# Patient Record
Sex: MALE | Race: WHITE | NOT HISPANIC OR LATINO | Employment: UNEMPLOYED | URBAN - METROPOLITAN AREA
[De-identification: names, ages, dates, MRNs, and addresses within clinical notes are randomized per-mention and may not be internally consistent; named-entity substitution may affect disease eponyms.]

---

## 2017-08-15 ENCOUNTER — HOSPITAL ENCOUNTER (INPATIENT)
Facility: HOSPITAL | Age: 37
LOS: 7 days | Discharge: HOME | End: 2017-08-22
Attending: EMERGENCY MEDICINE | Admitting: INTERNAL MEDICINE

## 2017-08-15 DIAGNOSIS — E10.10 DIABETIC KETOACIDOSIS WITHOUT COMA ASSOCIATED WITH TYPE 1 DIABETES MELLITUS (HCC): Primary | ICD-10-CM

## 2017-08-15 PROBLEM — E11.10 DKA (DIABETIC KETOACIDOSES): Status: ACTIVE | Noted: 2017-08-15

## 2017-08-15 LAB
ALBUMIN SERPL-MCNC: 4.2 G/DL (ref 3.5–5.2)
ALBUMIN/GLOB SERPL: 1.8 G/DL
ALP SERPL-CCNC: 89 U/L (ref 39–117)
ALT SERPL W P-5'-P-CCNC: 15 U/L (ref 1–41)
ANION GAP SERPL CALCULATED.3IONS-SCNC: 25.7 MMOL/L
AST SERPL-CCNC: 13 U/L (ref 1–40)
B-OH-BUTYR SERPL-SCNC: 6.04 MMOL/L (ref 0.02–0.27)
BASOPHILS # BLD AUTO: 0.04 10*3/MM3 (ref 0–0.2)
BASOPHILS NFR BLD AUTO: 0.5 % (ref 0–1.5)
BILIRUB SERPL-MCNC: 0.9 MG/DL (ref 0.1–1.2)
BILIRUB UR QL STRIP: NEGATIVE
BUN BLD-MCNC: 17 MG/DL (ref 6–20)
BUN/CREAT SERPL: 22.4 (ref 7–25)
CALCIUM SPEC-SCNC: 8.3 MG/DL (ref 8.6–10.5)
CHLORIDE SERPL-SCNC: 95 MMOL/L (ref 98–107)
CLARITY UR: CLEAR
CO2 SERPL-SCNC: 15.3 MMOL/L (ref 22–29)
COLOR UR: YELLOW
CREAT BLD-MCNC: 0.76 MG/DL (ref 0.76–1.27)
DEPRECATED RDW RBC AUTO: 48 FL (ref 37–54)
EOSINOPHIL # BLD AUTO: 0.02 10*3/MM3 (ref 0–0.7)
EOSINOPHIL NFR BLD AUTO: 0.3 % (ref 0.3–6.2)
ERYTHROCYTE [DISTWIDTH] IN BLOOD BY AUTOMATED COUNT: 13.8 % (ref 11.5–14.5)
GFR SERPL CREATININE-BSD FRML MDRD: 117 ML/MIN/1.73
GLOBULIN UR ELPH-MCNC: 2.3 GM/DL
GLUCOSE BLD-MCNC: 473 MG/DL (ref 65–99)
GLUCOSE BLDC GLUCOMTR-MCNC: 380 MG/DL (ref 70–130)
GLUCOSE UR STRIP-MCNC: ABNORMAL MG/DL
HCT VFR BLD AUTO: 40.8 % (ref 40.4–52.2)
HGB BLD-MCNC: 12.8 G/DL (ref 13.7–17.6)
HGB UR QL STRIP.AUTO: NEGATIVE
IMM GRANULOCYTES # BLD: 0.02 10*3/MM3 (ref 0–0.03)
IMM GRANULOCYTES NFR BLD: 0.3 % (ref 0–0.5)
KETONES UR QL STRIP: ABNORMAL
LEUKOCYTE ESTERASE UR QL STRIP.AUTO: NEGATIVE
LYMPHOCYTES # BLD AUTO: 2.53 10*3/MM3 (ref 0.9–4.8)
LYMPHOCYTES NFR BLD AUTO: 32.9 % (ref 19.6–45.3)
MCH RBC QN AUTO: 29.6 PG (ref 27–32.7)
MCHC RBC AUTO-ENTMCNC: 31.4 G/DL (ref 32.6–36.4)
MCV RBC AUTO: 94.4 FL (ref 79.8–96.2)
MONOCYTES # BLD AUTO: 0.56 10*3/MM3 (ref 0.2–1.2)
MONOCYTES NFR BLD AUTO: 7.3 % (ref 5–12)
NEUTROPHILS # BLD AUTO: 4.51 10*3/MM3 (ref 1.9–8.1)
NEUTROPHILS NFR BLD AUTO: 58.7 % (ref 42.7–76)
NITRITE UR QL STRIP: NEGATIVE
PH UR STRIP.AUTO: 5.5 [PH] (ref 5–8)
PLATELET # BLD AUTO: 279 10*3/MM3 (ref 140–500)
PMV BLD AUTO: 10.4 FL (ref 6–12)
POTASSIUM BLD-SCNC: 4.5 MMOL/L (ref 3.5–5.2)
PROT SERPL-MCNC: 6.5 G/DL (ref 6–8.5)
PROT UR QL STRIP: NEGATIVE
RBC # BLD AUTO: 4.32 10*6/MM3 (ref 4.6–6)
SODIUM BLD-SCNC: 136 MMOL/L (ref 136–145)
SP GR UR STRIP: >=1.03 (ref 1–1.03)
UROBILINOGEN UR QL STRIP: ABNORMAL
WBC NRBC COR # BLD: 7.68 10*3/MM3 (ref 4.5–10.7)

## 2017-08-15 PROCEDURE — 85025 COMPLETE CBC W/AUTO DIFF WBC: CPT | Performed by: EMERGENCY MEDICINE

## 2017-08-15 PROCEDURE — 80053 COMPREHEN METABOLIC PANEL: CPT | Performed by: EMERGENCY MEDICINE

## 2017-08-15 PROCEDURE — 82330 ASSAY OF CALCIUM: CPT | Performed by: EMERGENCY MEDICINE

## 2017-08-15 PROCEDURE — 80307 DRUG TEST PRSMV CHEM ANLYZR: CPT | Performed by: INTERNAL MEDICINE

## 2017-08-15 PROCEDURE — 83735 ASSAY OF MAGNESIUM: CPT | Performed by: EMERGENCY MEDICINE

## 2017-08-15 PROCEDURE — 63710000001 INSULIN REGULAR HUMAN PER 5 UNITS: Performed by: EMERGENCY MEDICINE

## 2017-08-15 PROCEDURE — 84100 ASSAY OF PHOSPHORUS: CPT | Performed by: EMERGENCY MEDICINE

## 2017-08-15 PROCEDURE — 82962 GLUCOSE BLOOD TEST: CPT

## 2017-08-15 PROCEDURE — 81003 URINALYSIS AUTO W/O SCOPE: CPT | Performed by: EMERGENCY MEDICINE

## 2017-08-15 PROCEDURE — 99285 EMERGENCY DEPT VISIT HI MDM: CPT

## 2017-08-15 PROCEDURE — 82010 KETONE BODYS QUAN: CPT | Performed by: EMERGENCY MEDICINE

## 2017-08-15 RX ORDER — POTASSIUM CHLORIDE 7.46 G/1000ML
10 INJECTION, SOLUTION INTRAVENOUS
Status: DISCONTINUED | OUTPATIENT
Start: 2017-08-15 | End: 2017-08-22 | Stop reason: HOSPADM

## 2017-08-15 RX ORDER — POTASSIUM CHLORIDE 1.5 G/1.77G
20 POWDER, FOR SOLUTION ORAL AS NEEDED
Status: DISCONTINUED | OUTPATIENT
Start: 2017-08-15 | End: 2017-08-21 | Stop reason: CLARIF

## 2017-08-15 RX ORDER — SODIUM CHLORIDE AND POTASSIUM CHLORIDE 150; 450 MG/100ML; MG/100ML
250 INJECTION, SOLUTION INTRAVENOUS CONTINUOUS PRN
Status: DISCONTINUED | OUTPATIENT
Start: 2017-08-15 | End: 2017-08-22 | Stop reason: HOSPADM

## 2017-08-15 RX ORDER — POTASSIUM CHLORIDE 750 MG/1
10 CAPSULE, EXTENDED RELEASE ORAL AS NEEDED
Status: DISCONTINUED | OUTPATIENT
Start: 2017-08-15 | End: 2017-08-22 | Stop reason: HOSPADM

## 2017-08-15 RX ORDER — POTASSIUM CHLORIDE 1.5 G/1.77G
40 POWDER, FOR SOLUTION ORAL AS NEEDED
Status: DISCONTINUED | OUTPATIENT
Start: 2017-08-15 | End: 2017-08-21 | Stop reason: CLARIF

## 2017-08-15 RX ORDER — LURASIDONE HYDROCHLORIDE 40 MG/1
80 TABLET, FILM COATED ORAL 2 TIMES DAILY
COMMUNITY
End: 2017-08-22 | Stop reason: HOSPADM

## 2017-08-15 RX ORDER — POTASSIUM CHLORIDE 750 MG/1
40 CAPSULE, EXTENDED RELEASE ORAL AS NEEDED
Status: DISCONTINUED | OUTPATIENT
Start: 2017-08-15 | End: 2017-08-22 | Stop reason: HOSPADM

## 2017-08-15 RX ORDER — DEXTROSE MONOHYDRATE 25 G/50ML
12.5 INJECTION, SOLUTION INTRAVENOUS
Status: DISCONTINUED | OUTPATIENT
Start: 2017-08-15 | End: 2017-08-22 | Stop reason: HOSPADM

## 2017-08-15 RX ORDER — DEXTROSE, SODIUM CHLORIDE, AND POTASSIUM CHLORIDE 5; .45; .15 G/100ML; G/100ML; G/100ML
150 INJECTION INTRAVENOUS CONTINUOUS PRN
Status: DISCONTINUED | OUTPATIENT
Start: 2017-08-15 | End: 2017-08-16

## 2017-08-15 RX ORDER — ASPIRIN 81 MG/1
81 TABLET ORAL DAILY
COMMUNITY

## 2017-08-15 RX ORDER — DEXTROSE AND SODIUM CHLORIDE 5; .45 G/100ML; G/100ML
150 INJECTION, SOLUTION INTRAVENOUS CONTINUOUS PRN
Status: DISCONTINUED | OUTPATIENT
Start: 2017-08-15 | End: 2017-08-16

## 2017-08-15 RX ORDER — POTASSIUM CHLORIDE 1.5 G/1.77G
10 POWDER, FOR SOLUTION ORAL AS NEEDED
Status: DISCONTINUED | OUTPATIENT
Start: 2017-08-15 | End: 2017-08-21 | Stop reason: CLARIF

## 2017-08-15 RX ORDER — SODIUM CHLORIDE 450 MG/100ML
250 INJECTION, SOLUTION INTRAVENOUS CONTINUOUS
Status: DISCONTINUED | OUTPATIENT
Start: 2017-08-16 | End: 2017-08-16

## 2017-08-15 RX ORDER — POTASSIUM CHLORIDE 750 MG/1
20 CAPSULE, EXTENDED RELEASE ORAL AS NEEDED
Status: DISCONTINUED | OUTPATIENT
Start: 2017-08-15 | End: 2017-08-22 | Stop reason: HOSPADM

## 2017-08-15 RX ORDER — SODIUM CHLORIDE 0.9 % (FLUSH) 0.9 %
10 SYRINGE (ML) INJECTION AS NEEDED
Status: DISCONTINUED | OUTPATIENT
Start: 2017-08-15 | End: 2017-08-22 | Stop reason: HOSPADM

## 2017-08-15 RX ORDER — FLUOXETINE 10 MG/1
80 CAPSULE ORAL DAILY
COMMUNITY
End: 2017-08-22 | Stop reason: HOSPADM

## 2017-08-15 RX ADMIN — SODIUM CHLORIDE 0.1 UNITS/KG/HR: 9 INJECTION, SOLUTION INTRAVENOUS at 23:56

## 2017-08-15 RX ADMIN — SODIUM CHLORIDE 2000 ML: 9 INJECTION, SOLUTION INTRAVENOUS at 22:41

## 2017-08-15 RX ADMIN — HUMAN INSULIN 5 UNITS: 100 INJECTION, SOLUTION SUBCUTANEOUS at 23:48

## 2017-08-16 LAB
AMPHET+METHAMPHET UR QL: NEGATIVE
ANION GAP SERPL CALCULATED.3IONS-SCNC: 10.7 MMOL/L
ANION GAP SERPL CALCULATED.3IONS-SCNC: 22.1 MMOL/L
ARTERIAL PATENCY WRIST A: POSITIVE
ATMOSPHERIC PRESS: 748 MMHG
BARBITURATES UR QL SCN: NEGATIVE
BASE EXCESS BLDA CALC-SCNC: -3.5 MMOL/L (ref 0–2)
BDY SITE: ABNORMAL
BENZODIAZ UR QL SCN: NEGATIVE
BUN BLD-MCNC: 12 MG/DL (ref 6–20)
BUN BLD-MCNC: 17 MG/DL (ref 6–20)
BUN/CREAT SERPL: 24.5 (ref 7–25)
BUN/CREAT SERPL: 25.8 (ref 7–25)
CA-I BLD-MCNC: 4.9 MG/DL (ref 4.6–5.4)
CA-I BLD-MCNC: 5 MG/DL (ref 4.6–5.4)
CA-I SERPL ISE-MCNC: 1.23 MMOL/L (ref 1.1–1.35)
CA-I SERPL ISE-MCNC: 1.24 MMOL/L (ref 1.1–1.35)
CALCIUM SPEC-SCNC: 7.8 MG/DL (ref 8.6–10.5)
CALCIUM SPEC-SCNC: 8.2 MG/DL (ref 8.6–10.5)
CANNABINOIDS SERPL QL: POSITIVE
CHLORIDE SERPL-SCNC: 100 MMOL/L (ref 98–107)
CHLORIDE SERPL-SCNC: 106 MMOL/L (ref 98–107)
CO2 SERPL-SCNC: 13.9 MMOL/L (ref 22–29)
CO2 SERPL-SCNC: 22.3 MMOL/L (ref 22–29)
COCAINE UR QL: NEGATIVE
CREAT BLD-MCNC: 0.49 MG/DL (ref 0.76–1.27)
CREAT BLD-MCNC: 0.66 MG/DL (ref 0.76–1.27)
GFR SERPL CREATININE-BSD FRML MDRD: 137 ML/MIN/1.73
GFR SERPL CREATININE-BSD FRML MDRD: >150 ML/MIN/1.73
GLUCOSE BLD-MCNC: 441 MG/DL (ref 65–99)
GLUCOSE BLD-MCNC: 89 MG/DL (ref 65–99)
GLUCOSE BLDC GLUCOMTR-MCNC: 105 MG/DL (ref 70–130)
GLUCOSE BLDC GLUCOMTR-MCNC: 132 MG/DL (ref 70–130)
GLUCOSE BLDC GLUCOMTR-MCNC: 132 MG/DL (ref 70–130)
GLUCOSE BLDC GLUCOMTR-MCNC: 154 MG/DL (ref 70–130)
GLUCOSE BLDC GLUCOMTR-MCNC: 161 MG/DL (ref 70–130)
GLUCOSE BLDC GLUCOMTR-MCNC: 166 MG/DL (ref 70–130)
GLUCOSE BLDC GLUCOMTR-MCNC: 182 MG/DL (ref 70–130)
GLUCOSE BLDC GLUCOMTR-MCNC: 237 MG/DL (ref 70–130)
GLUCOSE BLDC GLUCOMTR-MCNC: 238 MG/DL (ref 70–130)
GLUCOSE BLDC GLUCOMTR-MCNC: 320 MG/DL (ref 70–130)
GLUCOSE BLDC GLUCOMTR-MCNC: 40 MG/DL (ref 70–130)
GLUCOSE BLDC GLUCOMTR-MCNC: 60 MG/DL (ref 70–130)
GLUCOSE BLDC GLUCOMTR-MCNC: 62 MG/DL (ref 70–130)
GLUCOSE BLDC GLUCOMTR-MCNC: 87 MG/DL (ref 70–130)
GLUCOSE BLDC GLUCOMTR-MCNC: 88 MG/DL (ref 70–130)
GLUCOSE BLDC GLUCOMTR-MCNC: 90 MG/DL (ref 70–130)
GLUCOSE BLDC GLUCOMTR-MCNC: 94 MG/DL (ref 70–130)
HCO3 BLDA-SCNC: 21.3 MMOL/L (ref 22–28)
HIV1 P24 AG SER QL: NORMAL
HIV1+2 AB SER QL: NORMAL
HOLD SPECIMEN: NORMAL
HOLD SPECIMEN: NORMAL
MAGNESIUM SERPL-MCNC: 1.9 MG/DL (ref 1.6–2.6)
MAGNESIUM SERPL-MCNC: 1.9 MG/DL (ref 1.6–2.6)
METHADONE UR QL SCN: NEGATIVE
MODALITY: ABNORMAL
OPIATES UR QL: NEGATIVE
OXYCODONE UR QL SCN: NEGATIVE
PCO2 BLDA: 36.9 MM HG (ref 35–45)
PH BLDA: 7.37 PH UNITS (ref 7.35–7.45)
PHOSPHATE SERPL-MCNC: 2.3 MG/DL (ref 2.5–4.5)
PHOSPHATE SERPL-MCNC: 3.4 MG/DL (ref 2.5–4.5)
PO2 BLDA: 97.3 MM HG (ref 80–100)
POTASSIUM BLD-SCNC: 4 MMOL/L (ref 3.5–5.2)
POTASSIUM BLD-SCNC: 4.9 MMOL/L (ref 3.5–5.2)
SAO2 % BLDCOA: 97.4 % (ref 92–99)
SODIUM BLD-SCNC: 136 MMOL/L (ref 136–145)
SODIUM BLD-SCNC: 139 MMOL/L (ref 136–145)
TOTAL RATE: 16 BREATHS/MINUTE
WHOLE BLOOD HOLD SPECIMEN: NORMAL
WHOLE BLOOD HOLD SPECIMEN: NORMAL

## 2017-08-16 PROCEDURE — 25810000003 POTASSIUM CHLORIDE PER 2 MEQ: Performed by: EMERGENCY MEDICINE

## 2017-08-16 PROCEDURE — 36600 WITHDRAWAL OF ARTERIAL BLOOD: CPT

## 2017-08-16 PROCEDURE — 63710000001 INSULIN DETEMER PER 5 UNITS: Performed by: INTERNAL MEDICINE

## 2017-08-16 PROCEDURE — 63710000001 INSULIN REGULAR HUMAN PER 5 UNITS: Performed by: INTERNAL MEDICINE

## 2017-08-16 PROCEDURE — 84100 ASSAY OF PHOSPHORUS: CPT | Performed by: EMERGENCY MEDICINE

## 2017-08-16 PROCEDURE — 25010000002 PYRIDOXINE PER 100 MG: Performed by: INTERNAL MEDICINE

## 2017-08-16 PROCEDURE — 25010000002 MAGNESIUM SULFATE PER 500 MG OF MAGNESIUM: Performed by: INTERNAL MEDICINE

## 2017-08-16 PROCEDURE — G0432 EIA HIV-1/HIV-2 SCREEN: HCPCS | Performed by: INTERNAL MEDICINE

## 2017-08-16 PROCEDURE — 25010000002 THIAMINE PER 100 MG: Performed by: INTERNAL MEDICINE

## 2017-08-16 PROCEDURE — 80048 BASIC METABOLIC PNL TOTAL CA: CPT | Performed by: EMERGENCY MEDICINE

## 2017-08-16 PROCEDURE — 82962 GLUCOSE BLOOD TEST: CPT

## 2017-08-16 PROCEDURE — 83735 ASSAY OF MAGNESIUM: CPT | Performed by: EMERGENCY MEDICINE

## 2017-08-16 PROCEDURE — 82330 ASSAY OF CALCIUM: CPT | Performed by: EMERGENCY MEDICINE

## 2017-08-16 PROCEDURE — 87899 AGENT NOS ASSAY W/OPTIC: CPT | Performed by: INTERNAL MEDICINE

## 2017-08-16 PROCEDURE — 82803 BLOOD GASES ANY COMBINATION: CPT

## 2017-08-16 RX ORDER — HYDROCODONE BITARTRATE AND ACETAMINOPHEN 5; 325 MG/1; MG/1
1 TABLET ORAL EVERY 6 HOURS PRN
Status: DISCONTINUED | OUTPATIENT
Start: 2017-08-16 | End: 2017-08-16

## 2017-08-16 RX ORDER — LURASIDONE HYDROCHLORIDE 40 MG/1
80 TABLET, FILM COATED ORAL
Status: DISCONTINUED | OUTPATIENT
Start: 2017-08-16 | End: 2017-08-17

## 2017-08-16 RX ORDER — HYDROCODONE BITARTRATE AND ACETAMINOPHEN 7.5; 325 MG/1; MG/1
1 TABLET ORAL EVERY 6 HOURS PRN
Status: DISCONTINUED | OUTPATIENT
Start: 2017-08-16 | End: 2017-08-22 | Stop reason: HOSPADM

## 2017-08-16 RX ORDER — SODIUM CHLORIDE 0.9 % (FLUSH) 0.9 %
1-10 SYRINGE (ML) INJECTION AS NEEDED
Status: DISCONTINUED | OUTPATIENT
Start: 2017-08-16 | End: 2017-08-22 | Stop reason: HOSPADM

## 2017-08-16 RX ORDER — BUTALBITAL, ACETAMINOPHEN AND CAFFEINE 50; 325; 40 MG/1; MG/1; MG/1
1 TABLET ORAL EVERY 4 HOURS PRN
Status: DISCONTINUED | OUTPATIENT
Start: 2017-08-16 | End: 2017-08-22 | Stop reason: HOSPADM

## 2017-08-16 RX ADMIN — HYDROCODONE BITARTRATE AND ACETAMINOPHEN 1 TABLET: 5; 325 TABLET ORAL at 08:08

## 2017-08-16 RX ADMIN — SODIUM CHLORIDE 1000 ML: 9 INJECTION, SOLUTION INTRAVENOUS at 00:02

## 2017-08-16 RX ADMIN — INSULIN DETEMIR 40 UNITS: 100 INJECTION, SOLUTION SUBCUTANEOUS at 09:56

## 2017-08-16 RX ADMIN — POTASSIUM CHLORIDE, DEXTROSE MONOHYDRATE AND SODIUM CHLORIDE 150 ML/HR: 150; 5; 450 INJECTION, SOLUTION INTRAVENOUS at 04:51

## 2017-08-16 RX ADMIN — HYDROCODONE BITARTRATE AND ACETAMINOPHEN 1 TABLET: 7.5; 325 TABLET ORAL at 20:35

## 2017-08-16 RX ADMIN — BUTALBITAL, ACETAMINOPHEN, AND CAFFEINE 1 TABLET: 50; 325; 40 TABLET ORAL at 14:42

## 2017-08-16 RX ADMIN — HYDROCODONE BITARTRATE AND ACETAMINOPHEN 1 TABLET: 5; 325 TABLET ORAL at 14:42

## 2017-08-16 RX ADMIN — SILVER SULFADIAZINE: 10 CREAM TOPICAL at 16:34

## 2017-08-16 RX ADMIN — DEXTROSE MONOHYDRATE 12.5 G: 25 INJECTION, SOLUTION INTRAVENOUS at 23:46

## 2017-08-16 RX ADMIN — POTASSIUM CHLORIDE AND SODIUM CHLORIDE 250 ML/HR: 450; 150 INJECTION, SOLUTION INTRAVENOUS at 01:59

## 2017-08-16 RX ADMIN — FOLIC ACID 100 ML/HR: 5 INJECTION, SOLUTION INTRAMUSCULAR; INTRAVENOUS; SUBCUTANEOUS at 05:45

## 2017-08-16 RX ADMIN — HUMAN INSULIN 10 UNITS: 100 INJECTION, SOLUTION SUBCUTANEOUS at 13:05

## 2017-08-16 NOTE — BRIEF OP NOTE
"Discharge Planning Assessment  [facility]     Patient Name: Octavio Pham  MRN: [id number]  Today's Date: 8/16/2017    Admit Date: 8/15/2017          Discharge Needs Assessment       08/16/17 8115    Living Environment    Lives With other (see comments)    Living Arrangements shelter    Type of Financial/Environmental Concern unable to afford rent;unemployed    Transportation Available none    Living Environment Comment Pt states that he lives and works at [facility].  He works for room and board.    Living Environment    Provides Primary Care For no one    Quality Of Family Relationships unable to assess;non-existent    Able to Return to Prior Living Arrangements other (see comments)    Living Arrangement Comments Pt states that he will return to [facility] at discharge until his next SSI check comes and then he will continue his travels to [place].    Discharge Needs Assessment    Concerns To Be Addressed patient refuses services    Concerns Comments Pt lives at [facility].  For healthcare, he goes to [facility].  They provide his psychiatric medications.    Readmission Within The Last 30 Days lack of support;previous discharge plan unsuccessful    Outpatient/Agency/Support Group Needs other (see comments)    Community Agency Name(S) [facility]    Anticipated Changes Related to Illness none    Equipment Currently Used at Home glucometer    Equipment Needed After Discharge --   undetermined    Discharge Facility/Level Of Care Needs other (see comments)    Current Discharge Risk psychiatric illness    Discharge Disposition still a patient            Discharge Plan       08/16/17 1727    Case Management/Social Work Plan    Plan Pt states that he will return to [facility] where he \"works\" and lives.    Patient/Family In Agreement With Plan yes    Additional Comments Spoke with pt at bedside.  Introduced self and explained role.  CCP contact information " "provided.  Face sheet verified.  Pt states that he gets his meds from [facility].  He states that he does not want to continue to use them though and will \"head to [place]\" when his next SSI check comes.  He currently lives at [facility] and states that he will return there at discharge.  States that he has \"no family who care\" about him and that he has no contact information for his father who lives in [place].  His emergency contact is his \"boss\", Captain Zi Cruz, at [employer].  He does not give me permission to speak with him.  Pt states that his psychiatric \"meds don't work\" and that is why his diabetes has been poorly controlled.  Pt has a glucometer.   CCP will continue to follow.        Discharge Placement     No information found                Demographic Summary       08/16/17 1507    Referral Information    Admission Type inpatient    Arrived From admitted as an inpatient    Primary Care Physician Information    Name [facility]            Functional Status       08/16/17 1508    Functional Status Prior    Ambulation 0-->independent    Transferring 0-->independent    Toileting 0-->independent    Bathing 0-->independent    Dressing 0-->independent    Eating 0-->independent    Communication 0-->understands/communicates without difficulty    Swallowing 0-->swallows foods/liquids without difficulty    IADL    Medications independent    Meal Preparation independent    Housekeeping independent    Laundry independent    Shopping independent    Oral Care independent            Psychosocial     None            Abuse/Neglect     None            Legal     None            Substance Abuse     None            Patient Forms     None          Nancy Gibson RN    "
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
## 2017-08-16 NOTE — PROGRESS NOTES
"Attempted [facility] eval.  Patient initially pleasant.  The IV pump began to beep and patient stated it wasn't helping due to \"migraine\" headache.  Aide came to room and reset, shortly later the IV began to beep again.  Pt became upset and threatened to break the \"thing\" if it didn't stop.  \"It's not helping my migraine.\"  Provided support.  An RN came in the room and reset again.  Patient informed RN that he is going to keep his arms folded across his body because that is what is most comfortable for him.  He declined to discuss his voices at this time.  He did not want to go any further w/ the eval.  He reports that he has been on Latuda, Depakote, and Prozac.    He did provide this information.  He currently stays at [facility].  He has been going to [place] and to [facility] for his medications.  He last has Latuda 2 days ago.  Patient has been in [place] for approximately 2 months. He was on his way from [place] to [place].  He is originally from [place].  He has had multiple trips to [facility].  He states that the medication isn't working. It would work if it \"killed the voices.\"  He states he doesn't know home any \"classifications of schizophrenia\" there are but the last for him was Schizoaffective.     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 They get at \"gift\" for working.  He is on SSI as he has no significant work history.     [facility] will attempt to complete eval. later today.  Per RN DKA has been resolved and she expects patient to be medically cleared today.    Discussed case w/ RN.    "
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
## 2017-08-16 NOTE — CONSULTS
Please see access center note and Dr. Vegas's psychiatric evaluation.  He has restarted the Latuda. He did not believe pt was exhibiting s/s of schizophrenia.  Patient is okay for discharge from psych perspective once medically cleared.
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
## 2017-08-16 NOTE — PAYOR COMM NOTE
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 a venous catheter Continuous As Needed (Once Glucose Less Than or Equal to 200 mg/dL and Serum Potassium Greater Than 5). - Intravenous    dextrose 5 % and sodium chloride 0.45 % with KCl 20 mEq/L infusion (Discontinued) 150 mL/hr Continuous PRN 8/15/2017 8/16/2017    Sig - Route: Infuse 150 mL/hr into a venous catheter Continuous As Needed (Once Glucose Less Than or Equal to 200 mg/dL and Serum Potassium Less Than or Equal to 5). - Intravenous    insulin regular (humuLIN R,novoLIN R) 100 Units in sodium chloride 0.9 % 100 mL (1 Units/mL) infusion (Discontinued) 0.1 Units/kg/hr × 49.9 kg Titrated 8/15/2017 8/16/2017    Sig - Route: Infuse 5 Units/hr into a venous catheter Dose Adjusted By Provider As Needed. - Intravenous    sodium chloride 0.45 % infusion (Discontinued) 250 mL/hr Continuous 8/16/2017 8/16/2017    Sig - Route: Infuse 250 mL/hr into a venous catheter Continuous. - Intravenous
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
## 2017-08-16 NOTE — CONSULTS
"Adult Nutrition  Assessment/PES    Patient Name:  Octavio Pham  YOB: 1980  MRN: [id number]  Admit Date:  8/15/2017    Assessment Date:  8/16/2017        Reason for Assessment       08/16/17 0859    Reason for Assessment    Reason For Assessment/Visit diagnosis/disease state    Diagnosis Diagnosis    Endocrine DM    Psychosocial Schizophrenia              Nutrition/Diet History       08/16/17 0907    Nutrition/Diet History    Factors Affecting Nutritional Intake Factors    Other not new to DM, homeless, can't remember if he has been taking his insulin            Anthropometrics       08/16/17 0907    Anthropometrics    Height 160 cm (62.99\")    RD Documented Current Weight  49.9 kg (110 lb)    Ideal Body Weight (IBW)    Ideal Body Weight (IBW), Male (kg) 56.9    Body Mass Index (BMI)    BMI Grade 19.1 - 24.9 - normal            Labs/Tests/Procedures/Meds       08/16/17 0908    Labs/Tests/Procedures/Meds    Diagnostic Test/Procedure Review reviewed    Labs/Tests Review Reviewed;Glucose;Phos;Hgb Hct    Medication Review Reviewed, pertinent;Insulin;Anticoag    Significant Vitals reviewed              Estimated/Assessed Needs       08/16/17 0918    Calculation Measurements    Weight Used For Calculations 49.9 kg (110 lb 0.2 oz)    Estimated/Assessed Energy Needs    Energy Need Method Kcal/kg    kcal/kg 35    35 Kcal/Kg (kcal) 1746.5    Estimated/Assessed Protein Needs    Weight Used for Protein Calculation 49.9 kg (110 lb 0.2 oz)    Protein (gm/kg) 1.2    1.2 Gm Protein (gm) 59.88    Estimated/Assessed Fluid Needs    Fluid Need Method RDA method    RDA Method (mL)  1745            Nutrition Prescription Ordered       08/16/17 0919    Nutrition Prescription PO    Current PO Diet Regular    Common Modifiers Consistent Carbohydrate                Problem/Interventions:        Problem 1       08/16/17 0921    Nutrition Diagnoses Problem 1    Problem 1 Nutrition Appropriate for Condition at this Time    " Etiology (related to) Medical Diagnosis    Endocrine DM                    Intervention Goal       08/16/17 0921    Intervention Goal    General Maintain nutrition    PO Tolerate PO    Weight Maintain weight            Nutrition Intervention       08/16/17 0922    Nutrition Intervention    RD/Tech Action Follow Tx progress;Care plan reviewd;Interview for preference              Education/Evaluation       08/16/17 0922    Education    Education Will Instruct as appropriate   c/o migraine at this time    Monitor/Evaluation    Monitor Per protocol            Electronically signed by:  Kinga Mendes RD  08/16/17 9:25 AM

## 2017-08-16 NOTE — PROGRESS NOTES
"Continued Stay Note  [facility]     Patient Name: Octavio Pham  MRN: [id number]  Today's Date: 8/16/2017    Admit Date: 8/15/2017          Discharge Plan       08/16/17 1606    Case Management/Social Work Plan    Additional Comments Printed off and gave pt contact information and program information for [facility] ([facility]  They have the ACT program which he could benefit from.  He agrees to call and try to get an appt for an assessment after he leaves the hospital.        08/16/17 1515    Case Management/Social Work Plan    Plan Pt states that he will return to [facility] where he \"works\" and lives.    Patient/Family In Agreement With Plan yes    Additional Comments Spoke with pt at bedside.  Introduced self and explained role.  CCP contact information provided.  Face sheet verified.  Pt states that he gets his meds from [facility].  He states that he does not want to continue to use them though and will \"head to [place]\" when his next SSI check comes.  He currently lives at [facility] and states that he will return there at discharge.  States that he has \"no family who care\" about him and that he has no contact information for his father who lives in [place].  His emergency contact is his \"boss\", Captain Zi Cruz, at [employer].  He does not give me permission to speak with him.  Pt states that his psychiatric \"meds don't work\" and that is why his diabetes has been poorly controlled.  Pt has a glucometer.   CCP will continue to follow.              Discharge Codes     None            Nancy Gibson RN    "

## 2017-08-17 LAB
ANION GAP SERPL CALCULATED.3IONS-SCNC: 11.7 MMOL/L
ANION GAP SERPL CALCULATED.3IONS-SCNC: 13.4 MMOL/L
BASOPHILS # BLD AUTO: 0.04 10*3/MM3 (ref 0–0.2)
BASOPHILS NFR BLD AUTO: 0.4 % (ref 0–1.5)
BUN BLD-MCNC: 4 MG/DL (ref 6–20)
BUN BLD-MCNC: 5 MG/DL (ref 6–20)
BUN/CREAT SERPL: 6.1 (ref 7–25)
BUN/CREAT SERPL: 9.4 (ref 7–25)
CALCIUM SPEC-SCNC: 8.2 MG/DL (ref 8.6–10.5)
CALCIUM SPEC-SCNC: 9.2 MG/DL (ref 8.6–10.5)
CHLORIDE SERPL-SCNC: 102 MMOL/L (ref 98–107)
CHLORIDE SERPL-SCNC: 103 MMOL/L (ref 98–107)
CO2 SERPL-SCNC: 26.3 MMOL/L (ref 22–29)
CO2 SERPL-SCNC: 26.6 MMOL/L (ref 22–29)
CREAT BLD-MCNC: 0.53 MG/DL (ref 0.76–1.27)
CREAT BLD-MCNC: 0.66 MG/DL (ref 0.76–1.27)
DEPRECATED RDW RBC AUTO: 47.1 FL (ref 37–54)
EOSINOPHIL # BLD AUTO: 0.12 10*3/MM3 (ref 0–0.7)
EOSINOPHIL NFR BLD AUTO: 1.2 % (ref 0.3–6.2)
ERYTHROCYTE [DISTWIDTH] IN BLOOD BY AUTOMATED COUNT: 13.9 % (ref 11.5–14.5)
GFR SERPL CREATININE-BSD FRML MDRD: 137 ML/MIN/1.73
GFR SERPL CREATININE-BSD FRML MDRD: >150 ML/MIN/1.73
GLUCOSE BLD-MCNC: 39 MG/DL (ref 65–99)
GLUCOSE BLD-MCNC: 67 MG/DL (ref 65–99)
GLUCOSE BLDC GLUCOMTR-MCNC: 101 MG/DL (ref 70–130)
GLUCOSE BLDC GLUCOMTR-MCNC: 122 MG/DL (ref 70–130)
GLUCOSE BLDC GLUCOMTR-MCNC: 129 MG/DL (ref 70–130)
GLUCOSE BLDC GLUCOMTR-MCNC: 171 MG/DL (ref 70–130)
GLUCOSE BLDC GLUCOMTR-MCNC: 175 MG/DL (ref 70–130)
GLUCOSE BLDC GLUCOMTR-MCNC: 197 MG/DL (ref 70–130)
GLUCOSE BLDC GLUCOMTR-MCNC: 204 MG/DL (ref 70–130)
GLUCOSE BLDC GLUCOMTR-MCNC: 40 MG/DL (ref 70–130)
GLUCOSE BLDC GLUCOMTR-MCNC: 43 MG/DL (ref 70–130)
GLUCOSE BLDC GLUCOMTR-MCNC: 50 MG/DL (ref 70–130)
GLUCOSE BLDC GLUCOMTR-MCNC: 98 MG/DL (ref 70–130)
HBA1C MFR BLD: 9.87 % (ref 4.8–5.6)
HCT VFR BLD AUTO: 41.6 % (ref 40.4–52.2)
HGB BLD-MCNC: 13.9 G/DL (ref 13.7–17.6)
IMM GRANULOCYTES # BLD: 0.02 10*3/MM3 (ref 0–0.03)
IMM GRANULOCYTES NFR BLD: 0.2 % (ref 0–0.5)
LYMPHOCYTES # BLD AUTO: 3.84 10*3/MM3 (ref 0.9–4.8)
LYMPHOCYTES NFR BLD AUTO: 38.2 % (ref 19.6–45.3)
MAGNESIUM SERPL-MCNC: 1.8 MG/DL (ref 1.6–2.6)
MAGNESIUM SERPL-MCNC: 1.9 MG/DL (ref 1.6–2.6)
MCH RBC QN AUTO: 30.9 PG (ref 27–32.7)
MCHC RBC AUTO-ENTMCNC: 33.4 G/DL (ref 32.6–36.4)
MCV RBC AUTO: 92.4 FL (ref 79.8–96.2)
MONOCYTES # BLD AUTO: 0.84 10*3/MM3 (ref 0.2–1.2)
MONOCYTES NFR BLD AUTO: 8.4 % (ref 5–12)
NEUTROPHILS # BLD AUTO: 5.19 10*3/MM3 (ref 1.9–8.1)
NEUTROPHILS NFR BLD AUTO: 51.6 % (ref 42.7–76)
PLATELET # BLD AUTO: 320 10*3/MM3 (ref 140–500)
PMV BLD AUTO: 10 FL (ref 6–12)
POTASSIUM BLD-SCNC: 3.2 MMOL/L (ref 3.5–5.2)
POTASSIUM BLD-SCNC: 3.6 MMOL/L (ref 3.5–5.2)
RBC # BLD AUTO: 4.5 10*6/MM3 (ref 4.6–6)
SODIUM BLD-SCNC: 141 MMOL/L (ref 136–145)
SODIUM BLD-SCNC: 142 MMOL/L (ref 136–145)
WBC NRBC COR # BLD: 10.05 10*3/MM3 (ref 4.5–10.7)

## 2017-08-17 PROCEDURE — 80048 BASIC METABOLIC PNL TOTAL CA: CPT | Performed by: INTERNAL MEDICINE

## 2017-08-17 PROCEDURE — 83036 HEMOGLOBIN GLYCOSYLATED A1C: CPT | Performed by: INTERNAL MEDICINE

## 2017-08-17 PROCEDURE — 25810000003 DEXTROSE-NACL PER 500 ML: Performed by: INTERNAL MEDICINE

## 2017-08-17 PROCEDURE — 99255 IP/OBS CONSLTJ NEW/EST HI 80: CPT | Performed by: INTERNAL MEDICINE

## 2017-08-17 PROCEDURE — 83735 ASSAY OF MAGNESIUM: CPT | Performed by: INTERNAL MEDICINE

## 2017-08-17 PROCEDURE — 63710000001 INSULIN ASPART PER 5 UNITS: Performed by: INTERNAL MEDICINE

## 2017-08-17 PROCEDURE — 63710000001 INSULIN DETEMER PER 5 UNITS: Performed by: INTERNAL MEDICINE

## 2017-08-17 PROCEDURE — 85025 COMPLETE CBC W/AUTO DIFF WBC: CPT | Performed by: INTERNAL MEDICINE

## 2017-08-17 PROCEDURE — 82962 GLUCOSE BLOOD TEST: CPT

## 2017-08-17 RX ORDER — QUETIAPINE FUMARATE 100 MG/1
100 TABLET, FILM COATED ORAL NIGHTLY
Status: DISCONTINUED | OUTPATIENT
Start: 2017-08-17 | End: 2017-08-19

## 2017-08-17 RX ORDER — NICOTINE POLACRILEX 4 MG
LOZENGE BUCCAL
Status: DISPENSED
Start: 2017-08-17 | End: 2017-08-17

## 2017-08-17 RX ORDER — NICOTINE POLACRILEX 4 MG
31 LOZENGE BUCCAL ONCE
Status: DISCONTINUED | OUTPATIENT
Start: 2017-08-17 | End: 2017-08-22 | Stop reason: HOSPADM

## 2017-08-17 RX ORDER — CHOLECALCIFEROL (VITAMIN D3) 125 MCG
5 CAPSULE ORAL NIGHTLY
Status: DISCONTINUED | OUTPATIENT
Start: 2017-08-17 | End: 2017-08-22 | Stop reason: HOSPADM

## 2017-08-17 RX ORDER — DEXTROSE AND SODIUM CHLORIDE 5; .9 G/100ML; G/100ML
75 INJECTION, SOLUTION INTRAVENOUS CONTINUOUS
Status: DISCONTINUED | OUTPATIENT
Start: 2017-08-17 | End: 2017-08-18

## 2017-08-17 RX ADMIN — SILVER SULFADIAZINE: 10 CREAM TOPICAL at 20:05

## 2017-08-17 RX ADMIN — BUTALBITAL, ACETAMINOPHEN, AND CAFFEINE 1 TABLET: 50; 325; 40 TABLET ORAL at 20:11

## 2017-08-17 RX ADMIN — QUETIAPINE FUMARATE 100 MG: 100 TABLET, FILM COATED ORAL at 20:07

## 2017-08-17 RX ADMIN — Medication 5 MG: at 20:07

## 2017-08-17 RX ADMIN — HYDROCODONE BITARTRATE AND ACETAMINOPHEN 1 TABLET: 7.5; 325 TABLET ORAL at 18:08

## 2017-08-17 RX ADMIN — DEXTROSE AND SODIUM CHLORIDE 75 ML/HR: 5; 900 INJECTION, SOLUTION INTRAVENOUS at 22:38

## 2017-08-17 RX ADMIN — SILVER SULFADIAZINE: 10 CREAM TOPICAL at 08:30

## 2017-08-17 RX ADMIN — INSULIN ASPART 2 UNITS: 100 INJECTION, SOLUTION INTRAVENOUS; SUBCUTANEOUS at 12:01

## 2017-08-17 RX ADMIN — BUTALBITAL, ACETAMINOPHEN, AND CAFFEINE 1 TABLET: 50; 325; 40 TABLET ORAL at 12:02

## 2017-08-17 RX ADMIN — DEXTROSE AND SODIUM CHLORIDE 75 ML/HR: 5; 900 INJECTION, SOLUTION INTRAVENOUS at 07:56

## 2017-08-17 RX ADMIN — SILVER SULFADIAZINE 1 APPLICATION: 10 CREAM TOPICAL at 04:17

## 2017-08-17 RX ADMIN — HYDROCODONE BITARTRATE AND ACETAMINOPHEN 1 TABLET: 7.5; 325 TABLET ORAL at 12:02

## 2017-08-17 RX ADMIN — INSULIN DETEMIR 20 UNITS: 100 INJECTION, SOLUTION SUBCUTANEOUS at 10:45

## 2017-08-17 RX ADMIN — DEXTROSE MONOHYDRATE 12.5 G: 25 INJECTION, SOLUTION INTRAVENOUS at 07:48

## 2017-08-17 RX ADMIN — LURASIDONE HYDROCHLORIDE 80 MG: 40 TABLET, FILM COATED ORAL at 15:41

## 2017-08-17 RX ADMIN — INSULIN ASPART 6 UNITS: 100 INJECTION, SOLUTION INTRAVENOUS; SUBCUTANEOUS at 18:43

## 2017-08-17 NOTE — PROGRESS NOTES
"Continued Stay Note  [facility]     Patient Name: Octavio Pham  MRN: [id number]  Today's Date: 8/17/2017    Admit Date: 8/15/2017          Discharge Plan       08/17/17 1516    Case Management/Social Work Plan    Plan [facility]    Additional Comments CCP met with pt to confirm d/c plan. Pt appeared to be interacting with internal stimuli throughout conversation and repeatedly voiced confusion due to \"the voices messing with his head.\" Pt stated he was uncertain whether or not he could return to [facility] where he has been living and working, and gave CCP permission to contact his boss/emergency contact (Zi Cruz, [phone]) to confirm d/c plan. CCP spoke with Zi Cruz who states he is the captain of security at [facility] where pt is employed and confirms pt can return there upon d/c. CCP informed pt he is able to return and pt expressed agreement. CCP to follow. Gail Hinson LCSW              Discharge Codes     None            Krystyna Hinson LCSW    "
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
## 2017-08-17 NOTE — CONSULTS
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
## 2017-08-17 NOTE — CONSULTS
37 y.o.  Patient Care Team:  No Known Provider as PCP - General      Chief Complaint   Patient presents with   • Hyperglycemia     bg 448, +ketones   • Psychiatric Evaluation     depression and medication review   Diabetic ketoacidosis    HPI   patient is a 37-year-old white male with a history of schizophrenia and uncontrolled type 1 diabetes mellitus since age 9 admitted to the hospital with nausea vomiting and hyperglycemia and was noted to be in diabetic ketoacidosis.  He was initially admitted to the intensive care unit with IV fluids and IV insulin and subsequently transferred to the regular floor and subcutaneous insulin regimen  Patient reported that he takes nearly 40 units of Levemir daily and also takes NovoLog 1 unit to every 4 carbs at the mealtime.  He also uses a correction scale of 1 unit for every 25/100.  He reports that he does not check his Accu-Cheks frequently  He also occasionally misses his insulin  He thinks he missed his insulin at least for a few days prior to hospitalization  Patient reports that he has schizophrenia and PTSD along with the depression and frequently cannot focus on his diabetes management    Hallucinations  Patient also reported previous episodes of suicidal ideation and attempts  Currently psychiatric as consulting on the patient  Uncontrolled type 1 diabetes mellitus with peripheral neuropathy  Patient reports symptoms of tingling and burning in both lower extremities  He has tried the gabapentin and Lyrica in the past but apparently Lyrica caused him suicidal ideation    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          Past Medical History:   Diagnosis Date   • Bipolar 1 disorder    • Depression    • Diabetes mellitus    • Kyphosis    • Migraine    • PTSD (post-traumatic stress disorder)    • Schizophrenia        History reviewed. No pertinent family  history.    Social History     Social History   • Marital status: Single     Spouse name: N/A   • Number of children: N/A   • Years of education: N/A     Occupational History   • Not on file.     Social History Main Topics   • Smoking status: Current Every Day Smoker   • Smokeless tobacco: Not on file   • Alcohol use No   • Drug use: Yes     Special: Marijuana   • Sexual activity: Not on file     Other Topics Concern   • Not on file     Social History Narrative   • No narrative on file       Allergies   Allergen Reactions   • Codeine    • Gabapentin      States makes neuropathy worse   • Geodon [Ziprasidone Hcl]    • Haldol [Haloperidol Lactate]    • Lyrica [Pregabalin]      Patient reports it makes him suicidal   • Risperidone And Related    • Thorazine [Chlorpromazine]    • Tramadol    • Trazodone And Nefazodone          Current Facility-Administered Medications:   •  butalbital-acetaminophen-caffeine (FIORICET, ESGIC) -40 MG per tablet 1 tablet, 1 tablet, Oral, Q4H PRN, Chanel Leach MD, 1 tablet at 08/17/17 1202  •  dextrose (D50W) solution 12.5 g, 12.5 g, Intravenous, Q15 Min PRN, Artem Lara MD, 12.5 g at 08/17/17 0748  •  dextrose (GLUTOSE) 40 % oral gel  - ADS Override Pull, , , ,   •  dextrose (GLUTOSE) oral gel 31 g, 31 g, Oral, Once, Chanel Leach MD  •  dextrose 5 % and sodium chloride 0.9 % infusion, 75 mL/hr, Intravenous, Continuous, Chanel Leach MD, Last Rate: 75 mL/hr at 08/17/17 0756, 75 mL/hr at 08/17/17 0756  •  enoxaparin (LOVENOX) syringe 40 mg, 40 mg, Subcutaneous, Daily, David Doyle MD  •  HYDROcodone-acetaminophen (NORCO) 7.5-325 MG per tablet 1 tablet, 1 tablet, Oral, Q6H PRN, Chanel Leach MD, 1 tablet at 08/17/17 1202  •  insulin aspart (novoLOG) injection 0-9 Units, 0-9 Units, Subcutaneous, 4x Daily With Meals & Nightly, Chanel Leach MD, 2 Units at 08/17/17 1201  •  insulin detemir (LEVEMIR) injection 20 Units, 20 Units, Subcutaneous, Daily, Chanel Leach MD, 20 Units  at 08/17/17 1045  •  insulin regular (humuLIN R,novoLIN R) injection 5 Units, 0.1 Units/kg, Intravenous, Once PRN, Artem Lara MD  •  lurasidone (LATUDA) tablet 80 mg, 80 mg, Oral, Daily With Dinner, Mikael Vegas III, MD  •  melatonin tablet 5 mg, 5 mg, Oral, Nightly, Chanel Leach MD  •  potassium chloride (MICRO-K) CR capsule 10 mEq, 10 mEq, Oral, PRN **OR** potassium chloride (KLOR-CON) packet 10 mEq, 10 mEq, Oral, PRN **OR** potassium chloride 10 mEq in 100 mL IVPB, 10 mEq, Intravenous, Q1H PRN, Artem Lara MD  •  potassium chloride (MICRO-K) CR capsule 20 mEq, 20 mEq, Oral, PRN **OR** potassium chloride (KLOR-CON) packet 20 mEq, 20 mEq, Oral, PRN **OR** potassium chloride 10 mEq in 100 mL IVPB, 10 mEq, Intravenous, Q1H PRN, Artem Lara MD  •  potassium chloride (MICRO-K) CR capsule 40 mEq, 40 mEq, Oral, PRN **OR** potassium chloride (KLOR-CON) packet 40 mEq, 40 mEq, Oral, PRN **OR** potassium chloride 10 mEq in 100 mL IVPB, 10 mEq, Intravenous, Q1H PRN, Artem Lara MD  •  silver sulfadiazine (SILVADENE, SSD) 1 % cream, , Topical, Q12H, David Doyle MD  •  sodium chloride 0.45 % with KCl 20 mEq/L infusion, 250 mL/hr, Intravenous, Continuous PRN, Artem Lara MD, Last Rate: 250 mL/hr at 08/16/17 0159, 250 mL/hr at 08/16/17 0159  •  sodium chloride 0.9 % flush 1-10 mL, 1-10 mL, Intravenous, PRN, David Doyle MD  •  sodium chloride 0.9 % flush 10 mL, 10 mL, Intravenous, PRN, Artem Lara MD         Review of Systems   Constitutional: Positive for appetite change and fatigue.   Eyes: Positive for visual disturbance.   Respiratory: Negative.    Cardiovascular: Negative.    Gastrointestinal: Positive for nausea.   Genitourinary: Negative.    Musculoskeletal: Negative.    Neurological: Positive for dizziness and numbness.   Psychiatric/Behavioral: Positive for behavioral problems.   All other systems reviewed and are negative.    Objective     Vital Signs  Temp:  [97.9 °F  (36.6 °C)-98.6 °F (37 °C)] 98.6 °F (37 °C)  Heart Rate:  [65-87] 87  Resp:  [18] 18  BP: ()/(55-83) 129/82    Physical Exam  Physical Exam   Constitutional: He is oriented to person, place, and time. He appears well-developed and well-nourished.   Eyes: EOM are normal. Pupils are equal, round, and reactive to light.   Neck: Normal range of motion. Neck supple.   Cardiovascular: Normal rate, regular rhythm, normal heart sounds and intact distal pulses.    Pulmonary/Chest: Effort normal and breath sounds normal.   Abdominal: Soft. Bowel sounds are normal. He exhibits distension. There is no tenderness.   Musculoskeletal: Normal range of motion. He exhibits no edema.   Neurological: He is alert and oriented to person, place, and time.   Skin: Skin is warm and dry.   Psychiatric: He has a normal mood and affect. His behavior is normal.   Nursing note and vitals reviewed.      Results Review:    I reviewed the patient's new clinical results.  Glucose   Date/Time Value Ref Range Status   08/17/2017 1501 122 70 - 130 mg/dL Final   08/17/2017 1156 197 (H) 70 - 130 mg/dL Final   08/17/2017 1059 171 (H) 70 - 130 mg/dL Final   08/17/2017 1040 175 (H) 70 - 130 mg/dL Final   08/17/2017 0728 50 (L) 70 - 130 mg/dL Final   08/17/2017 0329 129 70 - 130 mg/dL Final   08/17/2017 0017 204 (H) 70 - 130 mg/dL Final   08/16/2017 2332 40 (C) 70 - 130 mg/dL Final   08/16/2017 2132 94 70 - 130 mg/dL Final   08/16/2017 1935 90 70 - 130 mg/dL Final   08/16/2017 1918 62 (L) 70 - 130 mg/dL Final   08/16/2017 1737 60 (L) 70 - 130 mg/dL Final     Lab Results (last 72 hours)     Procedure Component Value Units Date/Time    CBC & Differential [[id number]] Collected:  08/15/17 2236    Specimen:  Blood Updated:  08/15/17 2249    Narrative:       The following orders were created for panel order CBC & Differential.  Procedure                               Abnormality         Status                     ---------                                -----------         ------                     CBC Auto Differential[[id number]]        Abnormal            Final result                 Please view results for these tests on the individual orders.    CBC Auto Differential [[id number]]  (Abnormal) Collected:  08/15/17 2236    Specimen:  Blood Updated:  08/15/17 2249     WBC 7.68 10*3/mm3      RBC 4.32 (L) 10*6/mm3      Hemoglobin 12.8 (L) g/dL      Hematocrit 40.8 %      MCV 94.4 fL      MCH 29.6 pg      MCHC 31.4 (L) g/dL      RDW 13.8 %      RDW-SD 48.0 fl      MPV 10.4 fL      Platelets 279 10*3/mm3      Neutrophil % 58.7 %      Lymphocyte % 32.9 %      Monocyte % 7.3 %      Eosinophil % 0.3 %      Basophil % 0.5 %      Immature Grans % 0.3 %      Neutrophils, Absolute 4.51 10*3/mm3      Lymphocytes, Absolute 2.53 10*3/mm3      Monocytes, Absolute 0.56 10*3/mm3      Eosinophils, Absolute 0.02 10*3/mm3      Basophils, Absolute 0.04 10*3/mm3      Immature Grans, Absolute 0.02 10*3/mm3     Ketone Bodies, Serum (Not performed at [facility]) [[id number]] Collected:  08/15/17 2236    Specimen:  Blood Updated:  08/15/17 2322    Narrative:       The following orders were created for panel order Ketone Bodies, Serum (Not performed at [facility]).  Procedure                               Abnormality         Status                     ---------                               -----------         ------                     Beta Hydroxybutyrate Francisco...[[id number]]  Abnormal            Final result                 Please view results for these tests on the individual orders.    Beta Hydroxybutyrate Quantitative [[id number]]  (Abnormal) Collected:  08/15/17 2236    Specimen:  Blood Updated:  08/15/17 2322     Beta-Hydroxybutyrate Quant 6.045 (H) mmol/L     Comprehensive Metabolic Panel [[id number]]  (Abnormal) Collected:  08/15/17 2236    Specimen:  Blood Updated:  08/15/17 2323     Glucose 473 (C) mg/dL      BUN 17 mg/dL      Creatinine 0.76 mg/dL      Sodium 136 mmol/L      Potassium 4.5  mmol/L      Chloride 95 (L) mmol/L      CO2 15.3 (L) mmol/L      Calcium 8.3 (L) mg/dL      Total Protein 6.5 g/dL      Albumin 4.20 g/dL      ALT (SGPT) 15 U/L      AST (SGOT) 13 U/L      Alkaline Phosphatase 89 U/L      Total Bilirubin 0.9 mg/dL      eGFR Non African Amer 117 mL/min/1.73      Globulin 2.3 gm/dL      A/G Ratio 1.8 g/dL      BUN/Creatinine Ratio 22.4     Anion Gap 25.7 mmol/L     POC Glucose Fingerstick [[id number]]  (Abnormal) Collected:  08/15/17 2330    Specimen:  Blood Updated:  08/15/17 2332     Glucose 380 (H) mg/dL     Narrative:       Meter: [id number] : [id number] Monica Gray    Urinalysis With / Culture If Indicated [[id number]]  (Abnormal) Collected:  08/15/17 2326    Specimen:  Urine from Urine, Clean Catch Updated:  08/15/17 2339     Color, UA Yellow     Appearance, UA Clear     pH, UA 5.5     Specific Gravity, UA >=1.030     Glucose, UA >=1000 mg/dL (3+) (A)     Ketones, UA 80 mg/dL (3+) (A)     Bilirubin, UA Negative     Blood, UA Negative     Protein, UA Negative     Leuk Esterase, UA Negative     Nitrite, UA Negative     Urobilinogen, UA 0.2 E.U./dL    Narrative:       Urine microscopic not indicated.    Light Blue Top [[id number]] Collected:  08/15/17 2236    Specimen:  Blood Updated:  08/16/17 0006     Extra Tube hold for add-on      Auto resulted       Lavender Top [[id number]] Collected:  08/15/17 2236    Specimen:  Blood Updated:  08/16/17 0006     Extra Tube hold for add-on      Auto resulted       Gold Top - SST [[id number]] Collected:  08/15/17 2236    Specimen:  Blood Updated:  08/16/17 0006     Extra Tube Hold for add-ons.      Auto resulted.       Julian Draw [[id number]] Collected:  08/15/17 2236    Specimen:  Blood Updated:  08/16/17 0006    Narrative:       The following orders were created for panel order Julian Draw.  Procedure                               Abnormality         Status                     ---------                               -----------          ------                     Light Blue Top[[id number]]                                   Final result               Green Top (Gel)[[id number]]                                  Final result               Lavender Top[[id number]]                                     Final result               Gold Top - SST[[id number]]                                   Final result                 Please view results for these tests on the individual orders.    Green Top (Gel) [[id number]] Collected:  08/15/17 2236    Specimen:  Blood Updated:  08/16/17 0006     Extra Tube Hold for add-ons.      Auto resulted.       Phosphorus [[id number]]  (Normal) Collected:  08/15/17 2337    Specimen:  Blood Updated:  08/16/17 0013     Phosphorus 3.4 mg/dL     Magnesium [[id number]]  (Normal) Collected:  08/15/17 2337    Specimen:  Blood Updated:  08/16/17 0013     Magnesium 1.9 mg/dL     Calcium, Ionized [[id number]]  (Normal) Collected:  08/15/17 2337    Specimen:  Blood Updated:  08/16/17 0016     Ionized Calcium 1.23 mmol/L      Ionized Calcium 4.9 mg/dL     POC Glucose Fingerstick [[id number]]  (Abnormal) Collected:  08/16/17 0021    Specimen:  Blood Updated:  08/16/17 0023     Glucose 320 (H) mg/dL     Narrative:       Meter: [id number] : [id number] Zane Evans    Basic Metabolic Panel [[id number]]  (Abnormal) Collected:  08/15/17 2337    Specimen:  Blood Updated:  08/16/17 0026     Glucose 441 (C) mg/dL      BUN 17 mg/dL      Creatinine 0.66 (L) mg/dL      Sodium 136 mmol/L      Potassium 4.9 mmol/L      Chloride 100 mmol/L      CO2 13.9 (L) mmol/L      Calcium 8.2 (L) mg/dL      eGFR Non African Amer 137 mL/min/1.73      BUN/Creatinine Ratio 25.8 (H)     Anion Gap 22.1 mmol/L     Narrative:       GFR Normal >60  Chronic Kidney Disease <60  Kidney Failure <15    POC Glucose Fingerstick [[id number]]  (Abnormal) Collected:  08/16/17 0102    Specimen:  Blood Updated:  08/16/17 0115     Glucose 238 (H) mg/dL     Narrative:       Meter: [id number]  : [id number] Jeremie Pop    POC Glucose Fingerstick [[id number]]  (Abnormal) Collected:  08/16/17 0208    Specimen:  Blood Updated:  08/16/17 0218     Glucose 161 (H) mg/dL     Narrative:       Meter: [id number] : [id number] Jeremie Pop    Urine Drug Screen [[id number]]  (Abnormal) Collected:  08/15/17 2326    Specimen:  Urine from Urine, Clean Catch Updated:  08/16/17 0256     Amphet/Methamphet, Screen Negative     Barbiturates Screen, Urine Negative     Benzodiazepine Screen, Urine Negative     Cocaine Screen, Urine Negative     Opiate Screen Negative     THC, Screen, Urine Positive (A)     Methadone Screen, Urine Negative     Oxycodone Screen, Urine Negative    Narrative:       Negative Thresholds For Drugs Screened:     Amphetamines               500 ng/ml   Barbiturates               200 ng/ml   Benzodiazepines            100 ng/ml   Cocaine                    300 ng/ml   Methadone                  300 ng/ml   Opiates                    300 ng/ml   Oxycodone                  100 ng/ml   THC                        50 ng/ml    The Normal Value for all drugs tested is negative. This report includes final unconfirmed screening results to be used for medical treatment purposes only. Unconfirmed results must not be used for non-medical purposes such as employment or legal testing. Clinical consideration should be applied to any drug of abuse test, particulary when unconfirmed results are used.    Blood Gas, Arterial [[id number]]  (Abnormal) Collected:  08/16/17 0328    Specimen:  Arterial Blood Updated:  08/16/17 0330     Site Arterial: left radial     Isaac's Test Positive     pH, Arterial 7.370 pH units      pCO2, Arterial 36.9 mm Hg      pO2, Arterial 97.3 mm Hg      HCO3, Arterial 21.3 (L) mmol/L      Base Excess, Arterial -3.5 (L) mmol/L      O2 Saturation Calculated 97.4 %      Barometric Pressure for Blood Gas 748.0 mmHg      Modality Room air     Rate 16 Breaths/minute     Narrative:       SAT 98%  Meter: [id number] : [id number] Evon Dowell    POC Glucose Fingerstick [[id number]]  (Normal) Collected:  08/16/17 0342    Specimen:  Blood Updated:  08/16/17 0413     Glucose 88 mg/dL     Narrative:       Meter: [id number] : [id number] Jeremie Hoic    POC Glucose Fingerstick [[id number]]  (Normal) Collected:  08/16/17 0402    Specimen:  Blood Updated:  08/16/17 0413     Glucose 87 mg/dL     Narrative:       Meter: [id number] : [id number] Remitlyic    Magnesium [[id number]]  (Normal) Collected:  08/16/17 0404    Specimen:  Blood Updated:  08/16/17 0511     Magnesium 1.9 mg/dL     Basic Metabolic Panel [[id number]]  (Abnormal) Collected:  08/16/17 0404    Specimen:  Blood Updated:  08/16/17 0511     Glucose 89 mg/dL      BUN 12 mg/dL      Creatinine 0.49 (L) mg/dL      Sodium 139 mmol/L      Potassium 4.0 mmol/L      Chloride 106 mmol/L      CO2 22.3 mmol/L      Calcium 7.8 (L) mg/dL      eGFR Non African Amer >150 mL/min/1.73      BUN/Creatinine Ratio 24.5     Anion Gap 10.7 mmol/L     Narrative:       GFR Normal >60  Chronic Kidney Disease <60  Kidney Failure <15    Phosphorus [[id number]]  (Abnormal) Collected:  08/16/17 0404    Specimen:  Blood Updated:  08/16/17 0511     Phosphorus 2.3 (L) mg/dL     POC Glucose Fingerstick [[id number]]  (Normal) Collected:  08/16/17 0527    Specimen:  Blood Updated:  08/16/17 0537     Glucose 105 mg/dL     Narrative:       Meter: [id number] : [id number] Boxer    Calcium, Ionized [[id number]]  (Normal) Collected:  08/16/17 0404    Specimen:  Blood Updated:  08/16/17 0538     Ionized Calcium 1.24 mmol/L      Ionized Calcium 5.0 mg/dL     POC Glucose Fingerstick [[id number]]  (Abnormal) Collected:  08/16/17 0609    Specimen:  Blood Updated:  08/16/17 0643     Glucose 132 (H) mg/dL     Narrative:       Meter: [id number] : [id number] Evelynraffy Donn    HIV-1 & HIV-2 Antibodies [[id number]] Collected:  08/16/17 0404    Specimen:  Blood Updated:   08/16/17 0649    Narrative:       The following orders were created for panel order HIV-1 & HIV-2 Antibodies.  Procedure                               Abnormality         Status                     ---------                               -----------         ------                     HIV-1 / O / 2 Ag / Antib...[[id number]]  Normal              Final result                 Please view results for these tests on the individual orders.    HIV-1 / O / 2 Ag / Antibody 4th Generation [[id number]]  (Normal) Collected:  08/16/17 0404    Specimen:  Blood Updated:  08/16/17 0649     HIV-1/ HIV-2 Non-Reactive     HIV-1 P24 Ag Screen Non-Reactive    POC Glucose Fingerstick [[id number]]  (Abnormal) Collected:  08/16/17 0710    Specimen:  Blood Updated:  08/16/17 0730     Glucose 154 (H) mg/dL     Narrative:       Meter: [id number] : [id number] Jeremie Pop    POC Glucose Fingerstick [[id number]]  (Abnormal) Collected:  08/16/17 0810    Specimen:  Blood Updated:  08/16/17 0812     Glucose 182 (H) mg/dL     Narrative:       Meter: [id number] : [id number] Toebbe Janet A.    POC Glucose Fingerstick [[id number]]  (Abnormal) Collected:  08/16/17 0954    Specimen:  Blood Updated:  08/16/17 0959     Glucose 237 (H) mg/dL     Narrative:       Meter: [id number] : [id number] Toebbe Janet A.    POC Glucose Fingerstick [[id number]]  (Abnormal) Collected:  08/16/17 1109    Specimen:  Blood Updated:  08/16/17 1119     Glucose 166 (H) mg/dL     Narrative:       Meter: [id number] : [id number] Toebbe Janet A.    POC Glucose Fingerstick [[id number]]  (Abnormal) Collected:  08/16/17 1213    Specimen:  Blood Updated:  08/16/17 1214     Glucose 132 (H) mg/dL     Narrative:       Meter: [id number] : [id number] Jonnie Alexander    POC Glucose Fingerstick [[id number]]  (Abnormal) Collected:  08/16/17 1737    Specimen:  Blood Updated:  08/16/17 1803     Glucose 60 (L) mg/dL     Narrative:       Meter: [id number] : [id number] Liliana NICOLE     POC Glucose Fingerstick [[id number]]  (Abnormal) Collected:  08/16/17 1918    Specimen:  Blood Updated:  08/16/17 1928     Glucose 62 (L) mg/dL     Narrative:       Meter: [id number] : [id number] Jeremie Pop    POC Glucose Fingerstick [[id number]]  (Normal) Collected:  08/16/17 1935    Specimen:  Blood Updated:  08/16/17 1941     Glucose 90 mg/dL     Narrative:       Meter: [id number] : [id number] John Paul Yuliya    POC Glucose Fingerstick [[id number]]  (Normal) Collected:  08/16/17 2132    Specimen:  Blood Updated:  08/16/17 2144     Glucose 94 mg/dL     Narrative:       Meter: [id number] : [id number] Corey Gonzales    POC Glucose Fingerstick [[id number]]  (Abnormal) Collected:  08/16/17 2332    Specimen:  Blood Updated:  08/16/17 2342     Glucose 40 (C) mg/dL     Narrative:       Meter: [id number] : [id number] John Paul Yuliya    POC Glucose Fingerstick [[id number]]  (Abnormal) Collected:  08/17/17 0017    Specimen:  Blood Updated:  08/17/17 0019     Glucose 204 (H) mg/dL     Narrative:       Meter: [id number] : [id number] Corey Gonzales    POC Glucose Fingerstick [[id number]]  (Normal) Collected:  08/17/17 0329    Specimen:  Blood Updated:  08/17/17 0331     Glucose 129 mg/dL     Narrative:       Meter: [id number] : [id number] John Paul Yuliya    POC Glucose Fingerstick [[id number]]  (Abnormal) Collected:  08/17/17 0728    Specimen:  Blood Updated:  08/17/17 0729     Glucose 50 (L) mg/dL     Narrative:       Meter: [id number] : [id number] IRINEO CAMACHO    Magnesium [[id number]]  (Normal) Collected:  08/17/17 0751    Specimen:  Blood Updated:  08/17/17 0831     Magnesium 1.8 mg/dL     Basic Metabolic Panel [[id number]]  (Abnormal) Collected:  08/17/17 0751    Specimen:  Blood Updated:  08/17/17 0834     Glucose 67 mg/dL      BUN 5 (L) mg/dL      Creatinine 0.53 (L) mg/dL      Sodium 141 mmol/L      Potassium 3.2 (L) mmol/L      Chloride 103 mmol/L      CO2 26.3 mmol/L      Calcium 8.2 (L) mg/dL      eGFR Non   Amer >150 mL/min/1.73      BUN/Creatinine Ratio 9.4     Anion Gap 11.7 mmol/L     Narrative:       GFR Normal >60  Chronic Kidney Disease <60  Kidney Failure <15    POC Glucose Fingerstick [[id number]]  (Abnormal) Collected:  08/17/17 1040    Specimen:  Blood Updated:  08/17/17 1048     Glucose 175 (H) mg/dL     Narrative:       Meter: [id number] : [id number] Liliana SULLIVANSteven    POC Glucose Fingerstick [[id number]]  (Abnormal) Collected:  08/17/17 1059    Specimen:  Blood Updated:  08/17/17 1100     Glucose 171 (H) mg/dL     Narrative:       Meter: [id number] : [id number] JANELMONALISA CAMACHO    POC Glucose Fingerstick [[id number]]  (Abnormal) Collected:  08/17/17 1156    Specimen:  Blood Updated:  08/17/17 1158     Glucose 197 (H) mg/dL     Narrative:       Meter: [id number] : [id number] Pedro Pablo CLEMENTE    CBC & Differential [[id number]] Collected:  08/17/17 1404    Specimen:  Blood Updated:  08/17/17 1419    Narrative:       The following orders were created for panel order CBC & Differential.  Procedure                               Abnormality         Status                     ---------                               -----------         ------                     CBC Auto Differential[[id number]]        Abnormal            Final result                 Please view results for these tests on the individual orders.    CBC Auto Differential [[id number]]  (Abnormal) Collected:  08/17/17 1404    Specimen:  Blood Updated:  08/17/17 1419     WBC 10.05 10*3/mm3      RBC 4.50 (L) 10*6/mm3      Hemoglobin 13.9 g/dL      Hematocrit 41.6 %      MCV 92.4 fL      MCH 30.9 pg      MCHC 33.4 g/dL      RDW 13.9 %      RDW-SD 47.1 fl      MPV 10.0 fL      Platelets 320 10*3/mm3      Neutrophil % 51.6 %      Lymphocyte % 38.2 %      Monocyte % 8.4 %      Eosinophil % 1.2 %      Basophil % 0.4 %      Immature Grans % 0.2 %      Neutrophils, Absolute 5.19 10*3/mm3      Lymphocytes, Absolute 3.84 10*3/mm3      Monocytes, Absolute  0.84 10*3/mm3      Eosinophils, Absolute 0.12 10*3/mm3      Basophils, Absolute 0.04 10*3/mm3      Immature Grans, Absolute 0.02 10*3/mm3     Magnesium [[id number]]  (Normal) Collected:  08/17/17 1404    Specimen:  Blood Updated:  08/17/17 1443     Magnesium 1.9 mg/dL     Basic Metabolic Panel [[id number]]  (Abnormal) Collected:  08/17/17 1404    Specimen:  Blood Updated:  08/17/17 1458     Glucose 39 (C) mg/dL      BUN 4 (L) mg/dL      Creatinine 0.66 (L) mg/dL      Sodium 142 mmol/L      Potassium 3.6 mmol/L      Chloride 102 mmol/L      CO2 26.6 mmol/L      Calcium 9.2 mg/dL      eGFR Non African Amer 137 mL/min/1.73      BUN/Creatinine Ratio 6.1 (L)     Anion Gap 13.4 mmol/L     Narrative:       GFR Normal >60  Chronic Kidney Disease <60  Kidney Failure <15    POC Glucose Fingerstick [[id number]]  (Normal) Collected:  08/17/17 1501    Specimen:  Blood Updated:  08/17/17 1502     Glucose 122 mg/dL     Narrative:       Meter: [id number] : [id number] Pedro Pablo CLEMENTE          Imaging Results (last 72 hours)     ** No results found for the last 72 hours. 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  #5 schizophrenia on medication   #6 hypoglycemia    Patient is currently receiving IV fluids  He started eating but not adequately  We will continue the basal and bolus insulin along with the scale for now  Will monitor the Accu-Cheks and adjust insulin as needed    I discussed further management of patient's type 1 diabetes with complications  Patient reports that he is frequently not focused and due to his schizophrenia and has not been able to take care of himself adequately  He  "also reports that he needs his medication adjusted for schizophrenia  Thank you for the consult      The total time spent for old record and lab review and floor time was more than 110 min of which greater than 60 min of time was spent on counseling the patient on recommended evaluation and treatment options, instructions for management/treatment and /or follow up  and importance of compliance with chosen management or treatment options    Matthew Barlow MD FACE.  08/17/17  3:33 PM        EMR Dragon / transcription disclaimer:     \"Dictated utilizing Dragon dictation\".   "

## 2017-08-18 LAB
ANION GAP SERPL CALCULATED.3IONS-SCNC: 11.8 MMOL/L
BUN BLD-MCNC: 7 MG/DL (ref 6–20)
BUN/CREAT SERPL: 10.3 (ref 7–25)
CALCIUM SPEC-SCNC: 8.3 MG/DL (ref 8.6–10.5)
CHLORIDE SERPL-SCNC: 107 MMOL/L (ref 98–107)
CO2 SERPL-SCNC: 25.2 MMOL/L (ref 22–29)
CREAT BLD-MCNC: 0.68 MG/DL (ref 0.76–1.27)
GFR SERPL CREATININE-BSD FRML MDRD: 133 ML/MIN/1.73
GLUCOSE BLD-MCNC: 180 MG/DL (ref 65–99)
GLUCOSE BLDC GLUCOMTR-MCNC: 144 MG/DL (ref 70–130)
GLUCOSE BLDC GLUCOMTR-MCNC: 210 MG/DL (ref 70–130)
GLUCOSE BLDC GLUCOMTR-MCNC: 218 MG/DL (ref 70–130)
GLUCOSE BLDC GLUCOMTR-MCNC: 42 MG/DL (ref 70–130)
GLUCOSE BLDC GLUCOMTR-MCNC: 68 MG/DL (ref 70–130)
GLUCOSE BLDC GLUCOMTR-MCNC: 95 MG/DL (ref 70–130)
GLUCOSE BLDC GLUCOMTR-MCNC: 96 MG/DL (ref 70–130)
MAGNESIUM SERPL-MCNC: 1.9 MG/DL (ref 1.6–2.6)
POTASSIUM BLD-SCNC: 4.4 MMOL/L (ref 3.5–5.2)
SODIUM BLD-SCNC: 144 MMOL/L (ref 136–145)

## 2017-08-18 PROCEDURE — 83735 ASSAY OF MAGNESIUM: CPT | Performed by: INTERNAL MEDICINE

## 2017-08-18 PROCEDURE — 82962 GLUCOSE BLOOD TEST: CPT

## 2017-08-18 PROCEDURE — 63710000001 INSULIN ASPART PER 5 UNITS: Performed by: INTERNAL MEDICINE

## 2017-08-18 PROCEDURE — 25810000003 DEXTROSE-NACL PER 500 ML: Performed by: INTERNAL MEDICINE

## 2017-08-18 PROCEDURE — 80048 BASIC METABOLIC PNL TOTAL CA: CPT | Performed by: INTERNAL MEDICINE

## 2017-08-18 PROCEDURE — 25010000002 ENOXAPARIN PER 10 MG: Performed by: INTERNAL MEDICINE

## 2017-08-18 PROCEDURE — 99233 SBSQ HOSP IP/OBS HIGH 50: CPT | Performed by: INTERNAL MEDICINE

## 2017-08-18 RX ADMIN — INSULIN ASPART 3 UNITS: 100 INJECTION, SOLUTION INTRAVENOUS; SUBCUTANEOUS at 10:25

## 2017-08-18 RX ADMIN — DEXTROSE AND SODIUM CHLORIDE 75 ML/HR: 5; 900 INJECTION, SOLUTION INTRAVENOUS at 12:15

## 2017-08-18 RX ADMIN — HYDROCODONE BITARTRATE AND ACETAMINOPHEN 1 TABLET: 7.5; 325 TABLET ORAL at 06:45

## 2017-08-18 RX ADMIN — INSULIN ASPART 6 UNITS: 100 INJECTION, SOLUTION INTRAVENOUS; SUBCUTANEOUS at 10:26

## 2017-08-18 RX ADMIN — SILVER SULFADIAZINE: 10 CREAM TOPICAL at 20:31

## 2017-08-18 RX ADMIN — SILVER SULFADIAZINE: 10 CREAM TOPICAL at 10:28

## 2017-08-18 RX ADMIN — HYDROCODONE BITARTRATE AND ACETAMINOPHEN 1 TABLET: 7.5; 325 TABLET ORAL at 20:31

## 2017-08-18 RX ADMIN — HYDROCODONE BITARTRATE AND ACETAMINOPHEN 1 TABLET: 7.5; 325 TABLET ORAL at 12:16

## 2017-08-18 RX ADMIN — QUETIAPINE FUMARATE 100 MG: 100 TABLET, FILM COATED ORAL at 20:31

## 2017-08-18 RX ADMIN — INSULIN DETEMIR 20 UNITS: 100 INJECTION, SOLUTION SUBCUTANEOUS at 10:27

## 2017-08-18 RX ADMIN — Medication 5 MG: at 20:31

## 2017-08-18 RX ADMIN — INSULIN ASPART 3 UNITS: 100 INJECTION, SOLUTION INTRAVENOUS; SUBCUTANEOUS at 12:17

## 2017-08-18 RX ADMIN — INSULIN ASPART 6 UNITS: 100 INJECTION, SOLUTION INTRAVENOUS; SUBCUTANEOUS at 12:16
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
## 2017-08-18 NOTE — PROGRESS NOTES
37 y.o.   LOS: 3 days   Patient Care Team:  No Known Provider as PCP - General    Chief Complaint:  Uncontrolled type 1 diabetes mellitus with complications    Chief Complaint   Patient presents with   • Hyperglycemia     bg 448, +ketones   • Psychiatric Evaluation     depression and medication review       Subjective     HPI   Patient had episodes of hypoglycemia yesterday  He reports that he is not able to eat the food that he is receiving  He also complains about the limited calories in the low-carb diet  He reports that he eats at least [address] outside the hospital and has been able to take more insulin  Patient also had episodes of hypoglycemia yesterday due to noncompliance with the diet  Patient reports that he is constantly hungry and staff are only offering him orange juice or crackers    Interval History:    patient is a 37-year-old white male with a history of schizophrenia and uncontrolled type 1 diabetes mellitus since age 9 admitted to the hospital with nausea vomiting and hyperglycemia and was noted to be in diabetic ketoacidosis.  He was initially admitted to the intensive care unit with IV fluids and IV insulin and subsequently transferred to the regular floor and subcutaneous insulin regimen  Patient reported that he takes nearly 40 units of Levemir daily and also takes NovoLog 1 unit to every 4 carbs at the mealtime.  He also uses a correction scale of 1 unit for every 25/100.  He reports that he does not check his Accu-Cheks frequently  He also occasionally misses his insulin  He thinks he missed his insulin at least for a few days prior to hospitalization  Patient reports that he has schizophrenia and PTSD along with the depression and frequently cannot focus on his diabetes management     Hallucinations  Patient also reported previous episodes of suicidal ideation and attempts  Currently psychiatric as consulting on the patient  Uncontrolled type 1 diabetes mellitus with peripheral  neuropathy  Patient reports symptoms of tingling and burning in both lower extremities  He has tried the gabapentin and Lyrica in the past but apparently Lyrica caused him suicidal ideation     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  Review of Systems:      Review of Systems   Constitutional: Positive for fatigue.   Eyes: Positive for visual disturbance.   Respiratory: Negative.    Cardiovascular: Negative.    Gastrointestinal: Negative.    Neurological: Positive for numbness.   All other systems reviewed and are negative.    Objective     Vital Signs   Temp:  [98 °F (36.7 °C)-98.6 °F (37 °C)] 98 °F (36.7 °C)  Heart Rate:  [73-87] 73  Resp:  [16-18] 16  BP: ()/(56-82) 103/67    Physical Exam:  Physical Exam   Constitutional: He is oriented to person, place, and time.   HENT:   Head: Normocephalic and atraumatic.   Eyes: EOM are normal. Pupils are equal, round, and reactive to light.   Neck: Normal range of motion. Neck supple.   Cardiovascular: Normal rate, regular rhythm, normal heart sounds and intact distal pulses.    Pulmonary/Chest: Effort normal and breath sounds normal.   Abdominal: Soft. Bowel sounds are normal. He exhibits distension.   Musculoskeletal: Normal range of motion. He exhibits no edema.   Neurological: He is alert and oriented to person, place, and time.   Skin: Skin is warm and dry.   Psychiatric: He has a normal mood and affect. His behavior is normal.   Nursing note and vitals reviewed.  Results Review:     I reviewed the patient's new clinical results.      Glucose   Date/Time Value Ref Range Status   08/18/2017 0642 180 (H) 65 - 99 mg/dL Final   08/17/2017 1404 39 (C) 65 - 99 mg/dL Final   08/17/2017 0751 67 65 - 99 mg/dL Final   08/16/2017 0404 89 65 - 99 mg/dL Final   08/15/2017 2337 441 (C) 65 - 99 mg/dL Final   08/15/2017 2236 473 (C) 65 - 99 mg/dL Final     Lab Results  (last 72 hours)     Procedure Component Value Units Date/Time    CBC & Differential [[id number]] Collected:  08/15/17 2236    Specimen:  Blood Updated:  08/15/17 2249    Narrative:       The following orders were created for panel order CBC & Differential.  Procedure                               Abnormality         Status                     ---------                               -----------         ------                     CBC Auto Differential[[id number]]        Abnormal            Final result                 Please view results for these tests on the individual orders.    CBC Auto Differential [[id number]]  (Abnormal) Collected:  08/15/17 2236    Specimen:  Blood Updated:  08/15/17 2249     WBC 7.68 10*3/mm3      RBC 4.32 (L) 10*6/mm3      Hemoglobin 12.8 (L) g/dL      Hematocrit 40.8 %      MCV 94.4 fL      MCH 29.6 pg      MCHC 31.4 (L) g/dL      RDW 13.8 %      RDW-SD 48.0 fl      MPV 10.4 fL      Platelets 279 10*3/mm3      Neutrophil % 58.7 %      Lymphocyte % 32.9 %      Monocyte % 7.3 %      Eosinophil % 0.3 %      Basophil % 0.5 %      Immature Grans % 0.3 %      Neutrophils, Absolute 4.51 10*3/mm3      Lymphocytes, Absolute 2.53 10*3/mm3      Monocytes, Absolute 0.56 10*3/mm3      Eosinophils, Absolute 0.02 10*3/mm3      Basophils, Absolute 0.04 10*3/mm3      Immature Grans, Absolute 0.02 10*3/mm3     Ketone Bodies, Serum (Not performed at [place]) [[id number]] Collected:  08/15/17 2236    Specimen:  Blood Updated:  08/15/17 2322    Narrative:       The following orders were created for panel order Ketone Bodies, Serum (Not performed at [place]).  Procedure                               Abnormality         Status                     ---------                               -----------         ------                     Beta Hydroxybutyrate Francisco...[[id number]]  Abnormal            Final result                 Please view results for these tests on the individual orders.    Beta Hydroxybutyrate  Quantitative [[id number]]  (Abnormal) Collected:  08/15/17 2236    Specimen:  Blood Updated:  08/15/17 2322     Beta-Hydroxybutyrate Quant 6.045 (H) mmol/L     Comprehensive Metabolic Panel [[id number]]  (Abnormal) Collected:  08/15/17 2236    Specimen:  Blood Updated:  08/15/17 2323     Glucose 473 (C) mg/dL      BUN 17 mg/dL      Creatinine 0.76 mg/dL      Sodium 136 mmol/L      Potassium 4.5 mmol/L      Chloride 95 (L) mmol/L      CO2 15.3 (L) mmol/L      Calcium 8.3 (L) mg/dL      Total Protein 6.5 g/dL      Albumin 4.20 g/dL      ALT (SGPT) 15 U/L      AST (SGOT) 13 U/L      Alkaline Phosphatase 89 U/L      Total Bilirubin 0.9 mg/dL      eGFR Non African Amer 117 mL/min/1.73      Globulin 2.3 gm/dL      A/G Ratio 1.8 g/dL      BUN/Creatinine Ratio 22.4     Anion Gap 25.7 mmol/L     POC Glucose Fingerstick [[id number]]  (Abnormal) Collected:  08/15/17 2330    Specimen:  Blood Updated:  08/15/17 2332     Glucose 380 (H) mg/dL     Narrative:       Meter: [id number] : [id number] Monica Gray    Urinalysis With / Culture If Indicated [[id number]]  (Abnormal) Collected:  08/15/17 2326    Specimen:  Urine from Urine, Clean Catch Updated:  08/15/17 2339     Color, UA Yellow     Appearance, UA Clear     pH, UA 5.5     Specific Gravity, UA >=1.030     Glucose, UA >=1000 mg/dL (3+) (A)     Ketones, UA 80 mg/dL (3+) (A)     Bilirubin, UA Negative     Blood, UA Negative     Protein, UA Negative     Leuk Esterase, UA Negative     Nitrite, UA Negative     Urobilinogen, UA 0.2 E.U./dL    Narrative:       Urine microscopic not indicated.    Light Blue Top [[id number]] Collected:  08/15/17 2236    Specimen:  Blood Updated:  08/16/17 0006     Extra Tube hold for add-on      Auto resulted       Lavender Top [[id number]] Collected:  08/15/17 2236    Specimen:  Blood Updated:  08/16/17 0006     Extra Tube hold for add-on      Auto resulted       Gold Top - SST [[id number]] Collected:  08/15/17 2236    Specimen:  Blood Updated:   08/16/17 0006     Extra Tube Hold for add-ons.      Auto resulted.       Imperial Draw [[id number]] Collected:  08/15/17 2236    Specimen:  Blood Updated:  08/16/17 0006    Narrative:       The following orders were created for panel order Imperial Draw.  Procedure                               Abnormality         Status                     ---------                               -----------         ------                     Light Blue Top[[id number]]                                   Final result               Green Top (Gel)[[id number]]                                  Final result               Lavender Top[[id number]]                                     Final result               Gold Top - SST[[id number]]                                   Final result                 Please view results for these tests on the individual orders.    Green Top (Gel) [[id number]] Collected:  08/15/17 2236    Specimen:  Blood Updated:  08/16/17 0006     Extra Tube Hold for add-ons.      Auto resulted.       Phosphorus [[id number]]  (Normal) Collected:  08/15/17 2337    Specimen:  Blood Updated:  08/16/17 0013     Phosphorus 3.4 mg/dL     Magnesium [[id number]]  (Normal) Collected:  08/15/17 2337    Specimen:  Blood Updated:  08/16/17 0013     Magnesium 1.9 mg/dL     Calcium, Ionized [[id number]]  (Normal) Collected:  08/15/17 2337    Specimen:  Blood Updated:  08/16/17 0016     Ionized Calcium 1.23 mmol/L      Ionized Calcium 4.9 mg/dL     POC Glucose Fingerstick [[id number]]  (Abnormal) Collected:  08/16/17 0021    Specimen:  Blood Updated:  08/16/17 0023     Glucose 320 (H) mg/dL     Narrative:       Meter: [id number] : [id number] Zane Evans    Basic Metabolic Panel [[id number]]  (Abnormal) Collected:  08/15/17 2337    Specimen:  Blood Updated:  08/16/17 0026     Glucose 441 (C) mg/dL      BUN 17 mg/dL      Creatinine 0.66 (L) mg/dL      Sodium 136 mmol/L      Potassium 4.9 mmol/L      Chloride 100 mmol/L      CO2 13.9 (L) mmol/L       Calcium 8.2 (L) mg/dL      eGFR Non African Amer 137 mL/min/1.73      BUN/Creatinine Ratio 25.8 (H)     Anion Gap 22.1 mmol/L     Narrative:       GFR Normal >60  Chronic Kidney Disease <60  Kidney Failure <15    POC Glucose Fingerstick [[id number]]  (Abnormal) Collected:  08/16/17 0102    Specimen:  Blood Updated:  08/16/17 0115     Glucose 238 (H) mg/dL     Narrative:       Meter: [id number] : [id number] [email or web address]    POC Glucose Fingerstick [[id number]]  (Abnormal) Collected:  08/16/17 0208    Specimen:  Blood Updated:  08/16/17 0218     Glucose 161 (H) mg/dL     Narrative:       Meter: [id number] : [id number] [email or web address]    Urine Drug Screen [[id number]]  (Abnormal) Collected:  08/15/17 2326    Specimen:  Urine from Urine, Clean Catch Updated:  08/16/17 0256     Amphet/Methamphet, Screen Negative     Barbiturates Screen, Urine Negative     Benzodiazepine Screen, Urine Negative     Cocaine Screen, Urine Negative     Opiate Screen Negative     THC, Screen, Urine Positive (A)     Methadone Screen, Urine Negative     Oxycodone Screen, Urine Negative    Narrative:       Negative Thresholds For Drugs Screened:     Amphetamines               500 ng/ml   Barbiturates               200 ng/ml   Benzodiazepines            100 ng/ml   Cocaine                    300 ng/ml   Methadone                  300 ng/ml   Opiates                    300 ng/ml   Oxycodone                  100 ng/ml   THC                        50 ng/ml    The Normal Value for all drugs tested is negative. This report includes final unconfirmed screening results to be used for medical treatment purposes only. Unconfirmed results must not be used for non-medical purposes such as employment or legal testing. Clinical consideration should be applied to any drug of abuse test, particulary when unconfirmed results are used.    Blood Gas, Arterial [[id number]]  (Abnormal) Collected:  08/16/17 0328    Specimen:  Arterial Blood Updated:  08/16/17 0330      Site Arterial: left radial     Isaac's Test Positive     pH, Arterial 7.370 pH units      pCO2, Arterial 36.9 mm Hg      pO2, Arterial 97.3 mm Hg      HCO3, Arterial 21.3 (L) mmol/L      Base Excess, Arterial -3.5 (L) mmol/L      O2 Saturation Calculated 97.4 %      Barometric Pressure for Blood Gas 748.0 mmHg      Modality Room air     Rate 16 Breaths/minute     Narrative:       SAT 98% Meter: [id number] : [id number] Seabehzad Dowell    POC Glucose Fingerstick [[id number]]  (Normal) Collected:  08/16/17 0342    Specimen:  Blood Updated:  08/16/17 0413     Glucose 88 mg/dL     Narrative:       Meter: [id number] : [id number] Jeremie Pop    POC Glucose Fingerstick [[id number]]  (Normal) Collected:  08/16/17 0402    Specimen:  Blood Updated:  08/16/17 0413     Glucose 87 mg/dL     Narrative:       Meter: [id number] : [id number] [place] [employer]    Magnesium [[id number]]  (Normal) Collected:  08/16/17 0404    Specimen:  Blood Updated:  08/16/17 0511     Magnesium 1.9 mg/dL     Basic Metabolic Panel [[id number]]  (Abnormal) Collected:  08/16/17 0404    Specimen:  Blood Updated:  08/16/17 0511     Glucose 89 mg/dL      BUN 12 mg/dL      Creatinine 0.49 (L) mg/dL      Sodium 139 mmol/L      Potassium 4.0 mmol/L      Chloride 106 mmol/L      CO2 22.3 mmol/L      Calcium 7.8 (L) mg/dL      eGFR Non African Amer >150 mL/min/1.73      BUN/Creatinine Ratio 24.5     Anion Gap 10.7 mmol/L     Narrative:       GFR Normal >60  Chronic Kidney Disease <60  Kidney Failure <15    Phosphorus [[id number]]  (Abnormal) Collected:  08/16/17 0404    Specimen:  Blood Updated:  08/16/17 0511     Phosphorus 2.3 (L) mg/dL     POC Glucose Fingerstick [[id number]]  (Normal) Collected:  08/16/17 0527    Specimen:  Blood Updated:  08/16/17 0537     Glucose 105 mg/dL     Narrative:       Meter: [id number] : [id number]    Calcium, Ionized [[id number]]  (Normal) Collected:  08/16/17 0404    Specimen:  Blood Updated:   08/16/17 0538     Ionized Calcium 1.24 mmol/L      Ionized Calcium 5.0 mg/dL     POC Glucose Fingerstick [[id number]]  (Abnormal) Collected:  08/16/17 0609    Specimen:  Blood Updated:  08/16/17 0643     Glucose 132 (H) mg/dL     Narrative:       Meter: [id number] : [id number] Jeremie Pop    HIV-1 & HIV-2 Antibodies [[id number]] Collected:  08/16/17 0404    Specimen:  Blood Updated:  08/16/17 0649    Narrative:       The following orders were created for panel order HIV-1 & HIV-2 Antibodies.  Procedure                               Abnormality         Status                     ---------                               -----------         ------                     HIV-1 / O / 2 Ag / Antib...[[id number]]  Normal              Final result                 Please view results for these tests on the individual orders.    HIV-1 / O / 2 Ag / Antibody 4th Generation [[id number]]  (Normal) Collected:  08/16/17 0404    Specimen:  Blood Updated:  08/16/17 0649     HIV-1/ HIV-2 Non-Reactive     HIV-1 P24 Ag Screen Non-Reactive    POC Glucose Fingerstick [[id number]]  (Abnormal) Collected:  08/16/17 0710    Specimen:  Blood Updated:  08/16/17 0730     Glucose 154 (H) mg/dL     Narrative:       Meter: [id number] : [id number] Jeremie Pop    POC Glucose Fingerstick [[id number]]  (Abnormal) Collected:  08/16/17 0810    Specimen:  Blood Updated:  08/16/17 0812     Glucose 182 (H) mg/dL     Narrative:       Meter: [id number] : [id number] Toebbe Janet A.    POC Glucose Fingerstick [[id number]]  (Abnormal) Collected:  08/16/17 0954    Specimen:  Blood Updated:  08/16/17 0959     Glucose 237 (H) mg/dL     Narrative:       Meter: [id number] : [id number] Toebbe Janet A.    POC Glucose Fingerstick [[id number]]  (Abnormal) Collected:  08/16/17 1109    Specimen:  Blood Updated:  08/16/17 1119     Glucose 166 (H) mg/dL     Narrative:       Meter: [id number] : [id number] Liliana NICOLE    POC Glucose Fingerstick [[id number]]   (Abnormal) Collected:  08/16/17 1213    Specimen:  Blood Updated:  08/16/17 1214     Glucose 132 (H) mg/dL     Narrative:       Meter: [id number] : [id number] Jonnie Alexander    POC Glucose Fingerstick [[id number]]  (Abnormal) Collected:  08/16/17 1737    Specimen:  Blood Updated:  08/16/17 1803     Glucose 60 (L) mg/dL     Narrative:       Meter: [id number] : [id number] Liliana NICOLE    POC Glucose Fingerstick [[id number]]  (Abnormal) Collected:  08/16/17 1918    Specimen:  Blood Updated:  08/16/17 1928     Glucose 62 (L) mg/dL     Narrative:       Meter: [id number] : [id number] Jeremie Pop    POC Glucose Fingerstick [[id number]]  (Normal) Collected:  08/16/17 1935    Specimen:  Blood Updated:  08/16/17 1941     Glucose 90 mg/dL     Narrative:       Meter: [id number] : [id number] John Paul Yuliya    POC Glucose Fingerstick [[id number]]  (Normal) Collected:  08/16/17 2132    Specimen:  Blood Updated:  08/16/17 2144     Glucose 94 mg/dL     Narrative:       Meter: [id number] : [id number] Corey Gonzales    POC Glucose Fingerstick [[id number]]  (Abnormal) Collected:  08/16/17 2332    Specimen:  Blood Updated:  08/16/17 2342     Glucose 40 (C) mg/dL     Narrative:       Meter: [id number] : [id number] John Paul Yuliya    POC Glucose Fingerstick [[id number]]  (Abnormal) Collected:  08/17/17 0017    Specimen:  Blood Updated:  08/17/17 0019     Glucose 204 (H) mg/dL     Narrative:       Meter: [id number] : [id number] Corey Gonzales    POC Glucose Fingerstick [[id number]]  (Normal) Collected:  08/17/17 0329    Specimen:  Blood Updated:  08/17/17 0331     Glucose 129 mg/dL     Narrative:       Meter: [id number] : [id number] John Paul Yuliya    POC Glucose Fingerstick [[id number]]  (Abnormal) Collected:  08/17/17 0728    Specimen:  Blood Updated:  08/17/17 0729     Glucose 50 (L) mg/dL     Narrative:       Meter: [id number] : [id number] IRINEO CAMACHO    Magnesium [[id number]]  (Normal) Collected:  08/17/17 0751    Specimen:   Blood Updated:  08/17/17 0831     Magnesium 1.8 mg/dL     Basic Metabolic Panel [[id number]]  (Abnormal) Collected:  08/17/17 0751    Specimen:  Blood Updated:  08/17/17 0834     Glucose 67 mg/dL      BUN 5 (L) mg/dL      Creatinine 0.53 (L) mg/dL      Sodium 141 mmol/L      Potassium 3.2 (L) mmol/L      Chloride 103 mmol/L      CO2 26.3 mmol/L      Calcium 8.2 (L) mg/dL      eGFR Non African Amer >150 mL/min/1.73      BUN/Creatinine Ratio 9.4     Anion Gap 11.7 mmol/L     Narrative:       GFR Normal >60  Chronic Kidney Disease <60  Kidney Failure <15    POC Glucose Fingerstick [[id number]]  (Abnormal) Collected:  08/17/17 1040    Specimen:  Blood Updated:  08/17/17 1048     Glucose 175 (H) mg/dL     Narrative:       Meter: [id number] : [id number] Liliana NICOLE    POC Glucose Fingerstick [[id number]]  (Abnormal) Collected:  08/17/17 1059    Specimen:  Blood Updated:  08/17/17 1100     Glucose 171 (H) mg/dL     Narrative:       Meter: [id number] : [id number] IRINEO CAMACHO    POC Glucose Fingerstick [[id number]]  (Abnormal) Collected:  08/17/17 1156    Specimen:  Blood Updated:  08/17/17 1158     Glucose 197 (H) mg/dL     Narrative:       Meter: [id number] : [id number] Pedro Pablo CLEMENTE    CBC & Differential [[id number]] Collected:  08/17/17 1404    Specimen:  Blood Updated:  08/17/17 1419    Narrative:       The following orders were created for panel order CBC & Differential.  Procedure                               Abnormality         Status                     ---------                               -----------         ------                     CBC Auto Differential[[id number]]        Abnormal            Final result                 Please view results for these tests on the individual orders.    CBC Auto Differential [[id number]]  (Abnormal) Collected:  08/17/17 1404    Specimen:  Blood Updated:  08/17/17 1419     WBC 10.05 10*3/mm3      RBC 4.50 (L) 10*6/mm3      Hemoglobin 13.9 g/dL      Hematocrit 41.6 %       MCV 92.4 fL      MCH 30.9 pg      MCHC 33.4 g/dL      RDW 13.9 %      RDW-SD 47.1 fl      MPV 10.0 fL      Platelets 320 10*3/mm3      Neutrophil % 51.6 %      Lymphocyte % 38.2 %      Monocyte % 8.4 %      Eosinophil % 1.2 %      Basophil % 0.4 %      Immature Grans % 0.2 %      Neutrophils, Absolute 5.19 10*3/mm3      Lymphocytes, Absolute 3.84 10*3/mm3      Monocytes, Absolute 0.84 10*3/mm3      Eosinophils, Absolute 0.12 10*3/mm3      Basophils, Absolute 0.04 10*3/mm3      Immature Grans, Absolute 0.02 10*3/mm3     Magnesium [[id number]]  (Normal) Collected:  08/17/17 1404    Specimen:  Blood Updated:  08/17/17 1443     Magnesium 1.9 mg/dL     Basic Metabolic Panel [[id number]]  (Abnormal) Collected:  08/17/17 1404    Specimen:  Blood Updated:  08/17/17 1458     Glucose 39 (C) mg/dL      BUN 4 (L) mg/dL      Creatinine 0.66 (L) mg/dL      Sodium 142 mmol/L      Potassium 3.6 mmol/L      Chloride 102 mmol/L      CO2 26.6 mmol/L      Calcium 9.2 mg/dL      eGFR Non African Amer 137 mL/min/1.73      BUN/Creatinine Ratio 6.1 (L)     Anion Gap 13.4 mmol/L     Narrative:       GFR Normal >60  Chronic Kidney Disease <60  Kidney Failure <15    POC Glucose Fingerstick [[id number]]  (Normal) Collected:  08/17/17 1501    Specimen:  Blood Updated:  08/17/17 1502     Glucose 122 mg/dL     Narrative:       Meter: [id number] : [id number] Pedro Pablo CLEMENTE    POC Glucose Fingerstick [[id number]]  (Normal) Collected:  08/17/17 1649    Specimen:  Blood Updated:  08/17/17 1651     Glucose 101 mg/dL     Narrative:       Meter: [id number] : [id number] Pedro Pablo CLEMENTE    Hemoglobin A1c [[id number]]  (Abnormal) Collected:  08/17/17 1404    Specimen:  Blood Updated:  08/17/17 1712     Hemoglobin A1C 9.87 (H) %     Narrative:       Hemoglobin A1C Ranges:    Increased Risk for Diabetes  5.7% to 6.4%  Diabetes                     >= 6.5%  Diabetic Goal                < 7.0%    POC Glucose Fingerstick [[id number]]  (Abnormal)  Collected:  08/17/17 2141    Specimen:  Blood Updated:  08/17/17 2144     Glucose 40 (C) mg/dL     Narrative:       Meter: [id number] : [id number] Lexx Bridges    POC Glucose Fingerstick [[id number]]  (Abnormal) Collected:  08/17/17 2145    Specimen:  Blood Updated:  08/17/17 2148     Glucose 43 (C) mg/dL     Narrative:       Confirmed by Repeat RNNOTIFIED Meter: [id number] : [id number] Cody Gray    POC Glucose Fingerstick [[id number]]  (Normal) Collected:  08/17/17 2221    Specimen:  Blood Updated:  08/17/17 2222     Glucose 98 mg/dL     Narrative:       Meter: [id number] : [id number] Lexx Bridges    Magnesium [[id number]]  (Normal) Collected:  08/18/17 0642    Specimen:  Blood Updated:  08/18/17 0727     Magnesium 1.9 mg/dL     Basic Metabolic Panel [[id number]]  (Abnormal) Collected:  08/18/17 0642    Specimen:  Blood Updated:  08/18/17 0732     Glucose 180 (H) mg/dL      BUN 7 mg/dL      Creatinine 0.68 (L) mg/dL      Sodium 144 mmol/L      Potassium 4.4 mmol/L      Chloride 107 mmol/L      CO2 25.2 mmol/L      Calcium 8.3 (L) mg/dL      eGFR Non African Amer 133 mL/min/1.73      BUN/Creatinine Ratio 10.3     Anion Gap 11.8 mmol/L     Narrative:       GFR Normal >60  Chronic Kidney Disease <60  Kidney Failure <15    POC Glucose Fingerstick [[id number]]  (Abnormal) Collected:  08/18/17 0749    Specimen:  Blood Updated:  08/18/17 0750     Glucose 218 (H) mg/dL     Narrative:       Meter: [id number] : [id number] Pedro Pablo Mohan    POC Glucose Fingerstick [[id number]]  (Abnormal) Collected:  08/18/17 1132    Specimen:  Blood Updated:  08/18/17 1135     Glucose 210 (H) mg/dL     Narrative:       Meter: [id number] : [id number] Pedro Pablo Mohan        Imaging Results (last 72 hours)     ** No results found for the last 72 hours. **          Medication Review:       Current Facility-Administered Medications:   •  butalbital-acetaminophen-caffeine (FIORICET, ESGIC) -40 MG per tablet 1 tablet, 1 tablet,  Oral, Q4H PRN, Chanel Leach MD, 1 tablet at 08/17/17 2011  •  dextrose (D50W) solution 12.5 g, 12.5 g, Intravenous, Q15 Min PRN, Artem Lara MD, 12.5 g at 08/17/17 0748  •  dextrose (GLUTOSE) oral gel 31 g, 31 g, Oral, Once, Chanel Leach MD  •  dextrose 5 % and sodium chloride 0.9 % infusion, 75 mL/hr, Intravenous, Continuous, Chanel Leach MD, Last Rate: 75 mL/hr at 08/18/17 1215, 75 mL/hr at 08/18/17 1215  •  enoxaparin (LOVENOX) syringe 40 mg, 40 mg, Subcutaneous, Daily, David Doyle MD  •  HYDROcodone-acetaminophen (NORCO) 7.5-325 MG per tablet 1 tablet, 1 tablet, Oral, Q6H PRN, Chanel Leach MD, 1 tablet at 08/18/17 1216  •  insulin aspart (novoLOG) injection 2-5 Units, 2-5 Units, Subcutaneous, 4x Daily AC & at Bedtime, Matthew HAIRSTON MD, 3 Units at 08/18/17 1217  •  insulin aspart (novoLOG) injection 4 Units, 4 Units, Subcutaneous, TID With Meals, Matthew HAIRSTON MD  •  insulin detemir (LEVEMIR) injection 20 Units, 20 Units, Subcutaneous, Daily, Chanel Leach MD, 20 Units at 08/18/17 1027  •  melatonin tablet 5 mg, 5 mg, Oral, Nightly, Chanel Leach MD, 5 mg at 08/17/17 2007  •  potassium chloride (MICRO-K) CR capsule 10 mEq, 10 mEq, Oral, PRN **OR** potassium chloride (KLOR-CON) packet 10 mEq, 10 mEq, Oral, PRN **OR** potassium chloride 10 mEq in 100 mL IVPB, 10 mEq, Intravenous, Q1H PRN, Artem Lara MD  •  potassium chloride (MICRO-K) CR capsule 20 mEq, 20 mEq, Oral, PRN **OR** potassium chloride (KLOR-CON) packet 20 mEq, 20 mEq, Oral, PRN **OR** potassium chloride 10 mEq in 100 mL IVPB, 10 mEq, Intravenous, Q1H PRN, Artem Lara MD  •  potassium chloride (MICRO-K) CR capsule 40 mEq, 40 mEq, Oral, PRN **OR** potassium chloride (KLOR-CON) packet 40 mEq, 40 mEq, Oral, PRN **OR** potassium chloride 10 mEq in 100 mL IVPB, 10 mEq, Intravenous, Q1H PRN, Artem Lara MD  •  QUEtiapine (SEROquel) tablet 100 mg, 100 mg, Oral, Nightly, Mikael Vegas III, MD, 100 mg at  "08/17/17 2007  •  silver sulfadiazine (SILVADENE, SSD) 1 % cream, , Topical, Q12H, David Doyle MD  •  sodium chloride 0.45 % with KCl 20 mEq/L infusion, 250 mL/hr, Intravenous, Continuous PRN, Artem Lara MD, Last Rate: 250 mL/hr at 08/16/17 0159, 250 mL/hr at 08/16/17 0159  •  sodium chloride 0.9 % flush 1-10 mL, 1-10 mL, Intravenous, PRN, David Doyle MD  •  sodium chloride 0.9 % flush 10 mL, 10 mL, Intravenous, PRN, Artem Lara MD    Assessment/Plan     Patient Active Problem List   Diagnosis   • DKA (diabetic ketoacidoses)   #1 uncontrolled type 1 diabetes mellitus with hemoglobin A1c greater than 9.8%  #2 diabetic ketoacidosis appears to have resolved  #3 uncontrolled type 1 diabetes mellitus with neuropathy  #4 uncontrolled type 1 diabetes mellitus with retinopathy 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  He apparently could not tolerate gabapentin or Lyrica  Lyrica apparently caused him to have suicidal ideations    Patient is requesting to increase the calorie intake in his diet  He wants 2100-calorie diet to regular  I will decrease the NovoLog to 4 units 3 times daily with each meal along with a sliding scale  Will monitor the Accu-Cheks and adjust insulin as needed    The total time spent for old record and lab review and floor time was more than 35 min of which greater than 20 min of time was spent on counseling the patient on recommended evaluation and treatment options, instructions for management/treatment and /or follow up  and importance of compliance with chosen management or treatment options      Matthew Barlow MD FACE.  08/18/17  1:20 PM      EMR Dragon / transcription disclaimer:     \"Dictated utilizing Dragon dictation\".   "
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
## 2017-08-18 NOTE — PROGRESS NOTES
AC follow up.  Pt is pleasant and cooperative with interview.  Pt reports feeling somewhat better today with the Seroquel on board.  Pt states he is still hearing voices but it is less than yesterday.  He states that now he plans to stay in [place] upon discharge.  He will go to [place].  Pt has information on [facility] and stated he will probably follow up there for his mental health treatment.

## 2017-08-18 NOTE — CONSULTS
"Diabetes Education  Assessment/Teaching    Patient Name:  Octavio Pham  YOB: 1980  MRN: [id number]  Admit Date:  8/15/2017      Assessment Date:  8/18/2017       Most Recent Value    General Information      Referral From:  MD order [DKA protocol]    Height  5' 2.99\" (1.6 m)    Height Method  Stated    Weight  120 lb 9.6 oz (54.7 kg)    Weight Method  Bed scale    Pregnancy Assessment     Diabetes History     Education Preferences     Nutrition Information     Assessment Topics     DM Goals                Most Recent Value    DM Education Needs     Patient Response  Other (comment) [Denies educational needs at this time. States \"too early in the morning\".  CCP and staff RN notified.  ]            Other Comments:          Electronically signed by:  Pierre Gonzalez RN  08/18/17 9:28 AM  "

## 2017-08-18 NOTE — PLAN OF CARE
Problem: Patient Care Overview (Adult)  Goal: Plan of Care Review  Outcome: Ongoing (interventions implemented as appropriate)    08/18/17 0522   Coping/Psychosocial Response Interventions   Plan Of Care Reviewed With patient   Patient Care Overview   Progress progress towards functional goals is fair   Outcome Evaluation   Outcome Summary/Follow up Plan Pt with critical low BG last night,anxious during night shift. Pt upset with staff for taking vital signs at 2340. Discussed importance of blood glucose and vital sign monitoring while in hospital. Pt c/o headache, fioricet PRN. Pt states he is still hearing voices, when asked about the voices pt covers head and becomes guarded.         Problem: Diabetes, Type 1 (Adult)  Goal: Signs and Symptoms of Listed Potential Problems Will be Absent or Manageable (Diabetes, Type 1)  Outcome: Ongoing (interventions implemented as appropriate)

## 2017-08-18 NOTE — NURSING NOTE
Nursing assistant in room to check vital signs and reapply pt's heart monitor, pt began yelling at staff. Myself and two other RN's came to pt's room.  Pt states he has been woken up 4 times in the last 4 hours. I explained to pt the importance of monitoring his blood glucose and hourly rounds. Pt agreed to let staff reapply heart monitor. When asked about pain, pt states he is still in pain but is also still hearing voices. When asked about the voices, pt becomes guarded. Pt offered PRN pain medication for headache, pt refuses at this time. Will continue to monitor.

## 2017-08-19 ENCOUNTER — APPOINTMENT (OUTPATIENT)
Dept: CT IMAGING | Facility: HOSPITAL | Age: 37
End: 2017-08-19

## 2017-08-19 LAB
ANION GAP SERPL CALCULATED.3IONS-SCNC: 13.5 MMOL/L
BUN BLD-MCNC: 11 MG/DL (ref 6–20)
BUN/CREAT SERPL: 20.8 (ref 7–25)
CALCIUM SPEC-SCNC: 8.9 MG/DL (ref 8.6–10.5)
CHLORIDE SERPL-SCNC: 102 MMOL/L (ref 98–107)
CO2 SERPL-SCNC: 24.5 MMOL/L (ref 22–29)
CREAT BLD-MCNC: 0.53 MG/DL (ref 0.76–1.27)
GFR SERPL CREATININE-BSD FRML MDRD: >150 ML/MIN/1.73
GLUCOSE BLD-MCNC: 100 MG/DL (ref 65–99)
GLUCOSE BLDC GLUCOMTR-MCNC: 127 MG/DL (ref 70–130)
GLUCOSE BLDC GLUCOMTR-MCNC: 145 MG/DL (ref 70–130)
GLUCOSE BLDC GLUCOMTR-MCNC: 191 MG/DL (ref 70–130)
GLUCOSE BLDC GLUCOMTR-MCNC: 319 MG/DL (ref 70–130)
GLUCOSE BLDC GLUCOMTR-MCNC: 96 MG/DL (ref 70–130)
MAGNESIUM SERPL-MCNC: 2 MG/DL (ref 1.6–2.6)
POTASSIUM BLD-SCNC: 4 MMOL/L (ref 3.5–5.2)
SODIUM BLD-SCNC: 140 MMOL/L (ref 136–145)

## 2017-08-19 PROCEDURE — 70450 CT HEAD/BRAIN W/O DYE: CPT

## 2017-08-19 PROCEDURE — 63710000001 INSULIN ASPART PER 5 UNITS: Performed by: INTERNAL MEDICINE

## 2017-08-19 PROCEDURE — 83735 ASSAY OF MAGNESIUM: CPT | Performed by: INTERNAL MEDICINE

## 2017-08-19 PROCEDURE — 99231 SBSQ HOSP IP/OBS SF/LOW 25: CPT | Performed by: INTERNAL MEDICINE

## 2017-08-19 PROCEDURE — 82962 GLUCOSE BLOOD TEST: CPT

## 2017-08-19 PROCEDURE — 80048 BASIC METABOLIC PNL TOTAL CA: CPT | Performed by: INTERNAL MEDICINE

## 2017-08-19 PROCEDURE — 25010000002 ENOXAPARIN PER 10 MG: Performed by: INTERNAL MEDICINE

## 2017-08-19 PROCEDURE — 99253 IP/OBS CNSLTJ NEW/EST LOW 45: CPT | Performed by: PSYCHIATRY & NEUROLOGY

## 2017-08-19 RX ORDER — QUETIAPINE FUMARATE 100 MG/1
100 TABLET, FILM COATED ORAL EVERY 12 HOURS SCHEDULED
Status: DISCONTINUED | OUTPATIENT
Start: 2017-08-19 | End: 2017-08-22 | Stop reason: HOSPADM

## 2017-08-19 RX ADMIN — HYDROCODONE BITARTRATE AND ACETAMINOPHEN 1 TABLET: 7.5; 325 TABLET ORAL at 05:07

## 2017-08-19 RX ADMIN — Medication 5 MG: at 20:02

## 2017-08-19 RX ADMIN — BUTALBITAL, ACETAMINOPHEN, AND CAFFEINE 1 TABLET: 50; 325; 40 TABLET ORAL at 05:09

## 2017-08-19 RX ADMIN — HYDROCODONE BITARTRATE AND ACETAMINOPHEN 1 TABLET: 7.5; 325 TABLET ORAL at 20:02

## 2017-08-19 RX ADMIN — SILVER SULFADIAZINE: 10 CREAM TOPICAL at 08:44

## 2017-08-19 RX ADMIN — QUETIAPINE FUMARATE 100 MG: 100 TABLET, FILM COATED ORAL at 20:02

## 2017-08-19 RX ADMIN — QUETIAPINE FUMARATE 100 MG: 100 TABLET, FILM COATED ORAL at 13:29

## 2017-08-19 RX ADMIN — SILVER SULFADIAZINE: 10 CREAM TOPICAL at 20:03

## 2017-08-19 RX ADMIN — INSULIN ASPART 4 UNITS: 100 INJECTION, SOLUTION INTRAVENOUS; SUBCUTANEOUS at 08:45

## 2017-08-19 RX ADMIN — HYDROCODONE BITARTRATE AND ACETAMINOPHEN 1 TABLET: 7.5; 325 TABLET ORAL at 12:32

## 2017-08-19 RX ADMIN — INSULIN ASPART 5 UNITS: 100 INJECTION, SOLUTION INTRAVENOUS; SUBCUTANEOUS at 11:36

## 2017-08-19 RX ADMIN — INSULIN ASPART 4 UNITS: 100 INJECTION, SOLUTION INTRAVENOUS; SUBCUTANEOUS at 11:37

## 2017-08-19 RX ADMIN — INSULIN ASPART 4 UNITS: 100 INJECTION, SOLUTION INTRAVENOUS; SUBCUTANEOUS at 17:41

## 2017-08-19 RX ADMIN — INSULIN DETEMIR 20 UNITS: 100 INJECTION, SOLUTION SUBCUTANEOUS at 11:37

## 2017-08-19 NOTE — PLAN OF CARE
Problem: Diabetes, Type 1 (Adult)  Goal: Signs and Symptoms of Listed Potential Problems Will be Absent or Manageable (Diabetes, Type 1)  Outcome: Ongoing (interventions implemented as appropriate)
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
## 2017-08-19 NOTE — PROGRESS NOTES
"  ENDOCRINE    Subjective   Octavio Pham is a 37 y.o. male.     Follow-up diabetes.  Patient is not eating well and is afraid of getting nauseated.  Fasting glucose 127.  Random glucose .  Continue Levemir 20 units every morning and NovoLog 4 units with each meal.    Patient complaining of increased depression and auditory hallucinations.  Psychiatry seeing patient.      Objective   /78 (BP Location: Right arm, Patient Position: Lying)  Pulse 80  Temp 98 °F (36.7 °C) (Oral)   Resp 18  Ht 62.99\" (160 cm)  Wt 120 lb 9.6 oz (54.7 kg)  SpO2 95%  BMI 21.37 kg/m2  Physical Exam    Awake, alert, not in distress.  No rales or wheezes.    Regular heart rate and rhythm.  Abdomen soft.  No cyanosis.      Lab Results (last 24 hours)     Procedure Component Value Units Date/Time    POC Glucose Fingerstick [[id number]]  (Abnormal) Collected:  08/18/17 1410    Specimen:  Blood Updated:  08/18/17 1412     Glucose 42 (C) mg/dL     Narrative:       RN Notified R and V Meter: [id number] : [id number] Crystal Gonzales    POC Glucose Fingerstick [[id number]]  (Abnormal) Collected:  08/18/17 1433    Specimen:  Blood Updated:  08/18/17 1434     Glucose 68 (L) mg/dL     Narrative:       Meter: [id number] : [id number] Crystal Gonzales    POC Glucose Fingerstick [[id number]]  (Normal) Collected:  08/18/17 1458    Specimen:  Blood Updated:  08/18/17 1500     Glucose 95 mg/dL     Narrative:       Meter: [id number] : [id number] Crystal Gonzales    POC Glucose Fingerstick [[id number]]  (Normal) Collected:  08/18/17 1650    Specimen:  Blood Updated:  08/18/17 1653     Glucose 96 mg/dL     Narrative:       Meter: [id number] : [id number] Pedro Pablo Mohan    POC Glucose Fingerstick [[id number]]  (Abnormal) Collected:  08/18/17 1953    Specimen:  Blood Updated:  08/18/17 1954     Glucose 144 (H) mg/dL     Narrative:       Meter: [id number] : [id number] Joya CLEMENTE    POC Glucose Fingerstick [[id number]]  (Abnormal) Collected:  " 08/17/17 0835    Specimen:  Blood Updated:  08/19/17 0508     Glucose 191 (H) mg/dL     Narrative:       Meter: [id number] : [id number] Liliana NICOLE    POC Glucose Fingerstick [[id number]]  (Normal) Collected:  08/19/17 0726    Specimen:  Blood Updated:  08/19/17 0727     Glucose 127 mg/dL     Narrative:       Meter: [id number] : [id number] Sneha CLEMENTE    Magnesium [[id number]]  (Normal) Collected:  08/19/17 0644    Specimen:  Blood Updated:  08/19/17 0801     Magnesium 2.0 mg/dL     Basic Metabolic Panel [[id number]]  (Abnormal) Collected:  08/19/17 0644    Specimen:  Blood Updated:  08/19/17 0810     Glucose 100 (H) mg/dL      BUN 11 mg/dL      Creatinine 0.53 (L) mg/dL      Sodium 140 mmol/L      Potassium 4.0 mmol/L      Chloride 102 mmol/L      CO2 24.5 mmol/L      Calcium 8.9 mg/dL      eGFR Non African Amer >150 mL/min/1.73      BUN/Creatinine Ratio 20.8     Anion Gap 13.5 mmol/L     Narrative:       GFR Normal >60  Chronic Kidney Disease <60  Kidney Failure <15    POC Glucose Fingerstick [[id number]]  (Abnormal) Collected:  08/19/17 1101    Specimen:  Blood Updated:  08/19/17 1103     Glucose 319 (H) mg/dL     Narrative:       Meter: [id number] : [id number] Sneha CLEMENTE            Active Problems:    DKA (diabetic ketoacidoses)    Will watch on current insulin dose.
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
## 2017-08-20 LAB
ANION GAP SERPL CALCULATED.3IONS-SCNC: 15.3 MMOL/L
BUN BLD-MCNC: 12 MG/DL (ref 6–20)
BUN/CREAT SERPL: 18.8 (ref 7–25)
CALCIUM SPEC-SCNC: 9.2 MG/DL (ref 8.6–10.5)
CHLORIDE SERPL-SCNC: 102 MMOL/L (ref 98–107)
CO2 SERPL-SCNC: 23.7 MMOL/L (ref 22–29)
CREAT BLD-MCNC: 0.64 MG/DL (ref 0.76–1.27)
GFR SERPL CREATININE-BSD FRML MDRD: 142 ML/MIN/1.73
GLUCOSE BLD-MCNC: 227 MG/DL (ref 65–99)
GLUCOSE BLD-MCNC: 41 MG/DL (ref 65–99)
GLUCOSE BLDC GLUCOMTR-MCNC: 190 MG/DL (ref 70–130)
GLUCOSE BLDC GLUCOMTR-MCNC: 203 MG/DL (ref 70–130)
GLUCOSE BLDC GLUCOMTR-MCNC: 242 MG/DL (ref 70–130)
GLUCOSE BLDC GLUCOMTR-MCNC: 340 MG/DL (ref 70–130)
GLUCOSE BLDC GLUCOMTR-MCNC: 42 MG/DL (ref 70–130)
GLUCOSE BLDC GLUCOMTR-MCNC: 433 MG/DL (ref 70–130)
GLUCOSE BLDC GLUCOMTR-MCNC: 46 MG/DL (ref 70–130)
GLUCOSE BLDC GLUCOMTR-MCNC: 57 MG/DL (ref 70–130)
GLUCOSE BLDC GLUCOMTR-MCNC: 64 MG/DL (ref 70–130)
GLUCOSE BLDC GLUCOMTR-MCNC: 87 MG/DL (ref 70–130)
MAGNESIUM SERPL-MCNC: 2.1 MG/DL (ref 1.6–2.6)
POTASSIUM BLD-SCNC: 3.9 MMOL/L (ref 3.5–5.2)
SODIUM BLD-SCNC: 141 MMOL/L (ref 136–145)

## 2017-08-20 PROCEDURE — 99232 SBSQ HOSP IP/OBS MODERATE 35: CPT | Performed by: PSYCHIATRY & NEUROLOGY

## 2017-08-20 PROCEDURE — 82947 ASSAY GLUCOSE BLOOD QUANT: CPT | Performed by: INTERNAL MEDICINE

## 2017-08-20 PROCEDURE — 83735 ASSAY OF MAGNESIUM: CPT | Performed by: INTERNAL MEDICINE

## 2017-08-20 PROCEDURE — 80048 BASIC METABOLIC PNL TOTAL CA: CPT | Performed by: INTERNAL MEDICINE

## 2017-08-20 PROCEDURE — 82962 GLUCOSE BLOOD TEST: CPT

## 2017-08-20 PROCEDURE — 63710000001 INSULIN ASPART PER 5 UNITS: Performed by: INTERNAL MEDICINE

## 2017-08-20 PROCEDURE — 99231 SBSQ HOSP IP/OBS SF/LOW 25: CPT | Performed by: INTERNAL MEDICINE

## 2017-08-20 RX ADMIN — SILVER SULFADIAZINE: 10 CREAM TOPICAL at 20:56

## 2017-08-20 RX ADMIN — Medication 5 MG: at 20:54

## 2017-08-20 RX ADMIN — DEXTROSE MONOHYDRATE 12.5 G: 25 INJECTION, SOLUTION INTRAVENOUS at 17:33

## 2017-08-20 RX ADMIN — HYDROCODONE BITARTRATE AND ACETAMINOPHEN 1 TABLET: 7.5; 325 TABLET ORAL at 12:05

## 2017-08-20 RX ADMIN — QUETIAPINE FUMARATE 100 MG: 100 TABLET, FILM COATED ORAL at 20:54

## 2017-08-20 RX ADMIN — INSULIN ASPART 5 UNITS: 100 INJECTION, SOLUTION INTRAVENOUS; SUBCUTANEOUS at 20:54

## 2017-08-20 RX ADMIN — QUETIAPINE FUMARATE 100 MG: 100 TABLET, FILM COATED ORAL at 08:25

## 2017-08-20 RX ADMIN — INSULIN ASPART 4 UNITS: 100 INJECTION, SOLUTION INTRAVENOUS; SUBCUTANEOUS at 11:51

## 2017-08-20 RX ADMIN — INSULIN DETEMIR 20 UNITS: 100 INJECTION, SOLUTION SUBCUTANEOUS at 11:52

## 2017-08-20 RX ADMIN — INSULIN ASPART 5 UNITS: 100 INJECTION, SOLUTION INTRAVENOUS; SUBCUTANEOUS at 11:51

## 2017-08-20 RX ADMIN — SILVER SULFADIAZINE: 10 CREAM TOPICAL at 08:27

## 2017-08-20 NOTE — PROGRESS NOTES
Patient Identification:  NAME:  Octavio Pham  Age:  37 y.o.   Sex:  male   :  1980   MRN:  [id number]       Chief complaint: Depression schizophrenia hypoglycemic encephalopathy    History of present illness:  He had a hypoglycemic episode earlier corrected with shoulder he's better now but is just morbidly depressed somewhat flat affect saying he doesn't want to live and doesn't want to be here.  I have told him that hopefully the folks with psychiatry will be able to help.  He otherwise appears neurologically stable and I performed an independent eyeball review of his CAT scan of his head which shows no acute abnormality      Past medical history:  Past Medical History:   Diagnosis Date   • Bipolar 1 disorder    • Depression    • Diabetes mellitus    • Kyphosis    • Migraine    • PTSD (post-traumatic stress disorder)    • Schizophrenia        Allergies:  Codeine; Gabapentin; Geodon [ziprasidone hcl]; Haldol [haloperidol lactate]; Lyrica [pregabalin]; Risperidone and related; Thorazine [chlorpromazine]; Tramadol; and Trazodone and nefazodone    Home medications:  Prescriptions Prior to Admission   Medication Sig Dispense Refill Last Dose   • aspirin 81 MG EC tablet Take 81 mg by mouth Daily.   8/15/2017 at Unknown time   • Atorvastatin Calcium (LIPITOR PO) Take  by mouth.   8/15/2017 at Unknown time   • FLUoxetine (PROZAC) 10 MG capsule Take 80 mg by mouth Daily.   8/15/2017 at Unknown time   • insulin detemir (LEVEMIR) 100 UNIT/ML injection Inject 40 Units under the skin Daily.   8/15/2017 at Unknown time   • insulin lispro (humaLOG) 100 UNIT/ML injection Inject  under the skin 3 (Three) Times a Day Before Meals.   8/15/2017 at Unknown time   • lurasidone (LATUDA) 40 MG tablet tablet Take 80 mg by mouth 2 (Two) Times a Day.   8/15/2017 at Unknown time        Hospital medications:    dextrose 31 g Oral Once   enoxaparin 40 mg Subcutaneous Daily   insulin aspart 2-5 Units Subcutaneous 4x Daily AC & at  "Bedtime   insulin aspart 4 Units Subcutaneous TID With Meals   insulin detemir 20 Units Subcutaneous Daily   melatonin 5 mg Oral Nightly   QUEtiapine 100 mg Oral Q12H   silver sulfadiazine  Topical Q12H       sodium chloride 0.45 % with KCl 20 mEq 250 mL/hr Last Rate: 250 mL/hr (08/16/17 0159)     •  butalbital-acetaminophen-caffeine  •  dextrose  •  HYDROcodone-acetaminophen  •  potassium chloride **OR** potassium chloride **OR** potassium chloride  •  potassium chloride **OR** potassium chloride **OR** potassium chloride  •  potassium chloride **OR** potassium chloride **OR** potassium chloride  •  sodium chloride 0.45 % with KCl 20 mEq  •  sodium chloride  •  sodium chloride      Objective:  Vitals Ranges:   Temp:  [97.8 °F (36.6 °C)-98.7 °F (37.1 °C)] 98.5 °F (36.9 °C)  Heart Rate:  [74-88] 88  Resp:  [16-20] 16  BP: ()/(66-82) 110/73      Physical Exam: \"I just want to die I don't want to be here\" normal cranial nerves grossly as he lays on his side in bed but could not be examined individually he has increased tone throughout resisting reflexes trace throughout symmetrical toes downgoing bilaterally    Results review:   I reviewed the patient's new clinical results.    Data review:  Lab Results (last 24 hours)     Procedure Component Value Units Date/Time    POC Glucose Fingerstick [[id number]]  (Abnormal) Collected:  08/19/17 1634    Specimen:  Blood Updated:  08/19/17 1635     Glucose 145 (H) mg/dL     Narrative:       Meter: [id number] : [id number] Sneha Grubbs NA    POC Glucose Fingerstick [[id number]]  (Normal) Collected:  08/19/17 2047    Specimen:  Blood Updated:  08/19/17 2048     Glucose 96 mg/dL     Narrative:       Meter: [id number] : [id number] Mary Ann Ortiz    Magnesium [[id number]]  (Normal) Collected:  08/20/17 0433    Specimen:  Blood Updated:  08/20/17 0604     Magnesium 2.1 mg/dL     Basic Metabolic Panel [[id number]]  (Abnormal) Collected:  08/20/17 0433    Specimen:  Blood " Updated:  08/20/17 0620     Glucose 41 (C) mg/dL      BUN 12 mg/dL      Creatinine 0.64 (L) mg/dL      Sodium 141 mmol/L      Potassium 3.9 mmol/L      Chloride 102 mmol/L      CO2 23.7 mmol/L      Calcium 9.2 mg/dL      eGFR Non African Amer 142 mL/min/1.73      BUN/Creatinine Ratio 18.8     Anion Gap 15.3 mmol/L     Narrative:       GFR Normal >60  Chronic Kidney Disease <60  Kidney Failure <15    POC Glucose Fingerstick [[id number]]  (Normal) Collected:  08/20/17 0621    Specimen:  Blood Updated:  08/20/17 0622     Glucose 87 mg/dL     Narrative:       Meter: [id number] : [id number] Teresita CLEMENTE    POC Glucose Fingerstick [[id number]]  (Abnormal) Collected:  08/20/17 0726    Specimen:  Blood Updated:  08/20/17 0727     Glucose 64 (L) mg/dL     Narrative:       Meter: [id number] : [id number] Pedro Pablo Mohan    POC Glucose Fingerstick [[id number]]  (Abnormal) Collected:  08/20/17 0826    Specimen:  Blood Updated:  08/20/17 0832     Glucose 190 (H) mg/dL     Narrative:       Meter: [id number] : [id number] Mary Beth Strong RN    POC Glucose Fingerstick [[id number]]  (Abnormal) Collected:  08/20/17 1141    Specimen:  Blood Updated:  08/20/17 1143     Glucose 433 (H) mg/dL     Narrative:       Meter: [id number] : [id number] Mohan    POC Glucose Fingerstick [[id number]]  (Abnormal) Collected:  08/20/17 1354    Specimen:  Blood Updated:  08/20/17 1355     Glucose 242 (H) mg/dL     Narrative:       Meter: [id number] : [id number] Pedro Pablo Mohan           Imaging:  Imaging Results (last 24 hours)     Procedure Component Value Units Date/Time    CT Head Without Contrast [[id number]] Collected:  08/19/17 1933     Updated:  08/19/17 1933    Narrative:       CRANIAL CT SCAN WITHOUT CONTRAST     CLINICAL HISTORY: Headache; E10.10-Type 1 diabetes mellitus with  ketoacidosis without coma.     COMPARISON: None.     FINDINGS: Multiple axial images of the head were obtained without  contrast. There are no abnormal areas  of increased density or mass  effect. Ventricles, sulci, and cisterns appear normal. Bone window  images show mild and chronic appearing mucosal thickening in the left  maxillary and bilateral ethmoid sinuses.       Impression:       No acute intracranial abnormality.         This study was performed with techniques to keep radiation doses as low  as reasonably achievable (ALARA). Individualized dose reduction  techniques using automated exposure control or adjustment of mA and/or  kV according to the patient size were employed.                 Assessment and Plan:     Neurologically this patient appears stable I do not see evidence of stroke TIA seizure or central nervous system infection.  He has had some recurrent hypoglycemia and some hypoglycemic encephalopathy related to that earlier today, now resolved.  It should be noted he does appear to have terrible depression and I am certainly glad that we have psychiatry following him.  I do not have a lot else to add at this time and appreciate psychiatric input regarding this depression, on top of the schizophrenia thank you    Tyrell Cannon MD  08/20/17  3:14 PM
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
## 2017-08-20 NOTE — NURSING NOTE
"RN alerted BG 57, brought orange juice and janna crackers into room. Pt refused to eat or drink anything. Went back in room, told patient that if he wouldn't eat anything we would have to push D50, he responded \"no one is going to put their hands on me\".   "

## 2017-08-20 NOTE — PROGRESS NOTES
"  ENDOCRINE    Subjective   Octavio Pham is a 37 y.o. male.     Follow-up diabetes.  Denies nausea or vomiting.  Blood sugar dropped to 41 earlier this morning.  He received Levemir 20 units yesterday morning and 20 units later this morning because of hypoglycemic episodes before breakfast.  Will decrease Levemir to 16 units every morning.  Continue NovoLog 3 units with each meal plus as needed.    Objective   /81 (BP Location: Right arm, Patient Position: Lying)  Pulse 88  Temp 98.7 °F (37.1 °C) (Oral)   Resp 16  Ht 62.99\" (160 cm)  Wt 117 lb 8 oz (53.3 kg)  SpO2 95%  BMI 20.82 kg/m2  Physical Exam    Awake, alert, not in distress   no rales or wheezes  Regular heart rate and rhythm.  Abdomen soft, nontender.  No cyanosis or pedal    Lab Results (last 24 hours)     Procedure Component Value Units Date/Time    POC Glucose Fingerstick [[id number]]  (Abnormal) Collected:  08/19/17 1634    Specimen:  Blood Updated:  08/19/17 1635     Glucose 145 (H) mg/dL     Narrative:       Meter: [id number] : [id number] Sneha Grubbs NA    POC Glucose Fingerstick [[id number]]  (Normal) Collected:  08/19/17 2047    Specimen:  Blood Updated:  08/19/17 2048     Glucose 96 mg/dL     Narrative:       Meter: [id number] : [id number] Mary Ann Ortiz    Magnesium [[id number]]  (Normal) Collected:  08/20/17 0433    Specimen:  Blood Updated:  08/20/17 0604     Magnesium 2.1 mg/dL     Basic Metabolic Panel [[id number]]  (Abnormal) Collected:  08/20/17 0433    Specimen:  Blood Updated:  08/20/17 0620     Glucose 41 (C) mg/dL      BUN 12 mg/dL      Creatinine 0.64 (L) mg/dL      Sodium 141 mmol/L      Potassium 3.9 mmol/L      Chloride 102 mmol/L      CO2 23.7 mmol/L      Calcium 9.2 mg/dL      eGFR Non African Amer 142 mL/min/1.73      BUN/Creatinine Ratio 18.8     Anion Gap 15.3 mmol/L     Narrative:       GFR Normal >60  Chronic Kidney Disease <60  Kidney Failure <15    POC Glucose Fingerstick [[id number]]  (Normal) Collected: "  08/20/17 0621    Specimen:  Blood Updated:  08/20/17 0622     Glucose 87 mg/dL     Narrative:       Meter: [id number] : [id number] Teresita CLEMENTE    POC Glucose Fingerstick [[id number]]  (Abnormal) Collected:  08/20/17 0726    Specimen:  Blood Updated:  08/20/17 0727     Glucose 64 (L) mg/dL     Narrative:       Meter: [id number] : [id number] Pedro Pablo Mohan    POC Glucose Fingerstick [[id number]]  (Abnormal) Collected:  08/20/17 0826    Specimen:  Blood Updated:  08/20/17 0832     Glucose 190 (H) mg/dL     Narrative:       Meter: [id number] : [id number] Mary Beth Strong RN    POC Glucose Fingerstick [[id number]]  (Abnormal) Collected:  08/20/17 1141    Specimen:  Blood Updated:  08/20/17 1143     Glucose 433 (H) mg/dL     Narrative:       Meter: [id number] : [id number] Pedro Pablo Mohan            Active Problems:    DKA (diabetic ketoacidoses)      Insulin adjusted as discussed above.
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
## 2017-08-20 NOTE — PROGRESS NOTES
Chart was reviewed and discussed case w/ RN.  Patient has been angry w/ his physicians today.  He has wanted to leave.  RN states he told her that he would not get prescriptions if he left [facility].  She also states that he told her that if he returned to [place] he would have to spend 90 days in correction.  Discussed concerns a/b his being manipulative and concerns by Dr. Vegas of possible malingering.  At no time when has been seen by psych has he appeared to be hallucinating. Access is limiting the time w/ patient to limit the secondary gain patient gets as most of the conversation is focused on how bad his life is and how no one treats him well.  Plan is for him to return to [place] and to [facility].  He will need a prescription for the Seroquel when discharged.
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
## 2017-08-21 LAB
ANION GAP SERPL CALCULATED.3IONS-SCNC: 11.1 MMOL/L
BUN BLD-MCNC: 13 MG/DL (ref 6–20)
BUN/CREAT SERPL: 15.5 (ref 7–25)
CALCIUM SPEC-SCNC: 9 MG/DL (ref 8.6–10.5)
CHLORIDE SERPL-SCNC: 102 MMOL/L (ref 98–107)
CO2 SERPL-SCNC: 27.9 MMOL/L (ref 22–29)
CREAT BLD-MCNC: 0.84 MG/DL (ref 0.76–1.27)
GFR SERPL CREATININE-BSD FRML MDRD: 104 ML/MIN/1.73
GLUCOSE BLD-MCNC: 159 MG/DL (ref 65–99)
GLUCOSE BLDC GLUCOMTR-MCNC: 112 MG/DL (ref 70–130)
GLUCOSE BLDC GLUCOMTR-MCNC: 124 MG/DL (ref 70–130)
GLUCOSE BLDC GLUCOMTR-MCNC: 146 MG/DL (ref 70–130)
GLUCOSE BLDC GLUCOMTR-MCNC: 280 MG/DL (ref 70–130)
GLUCOSE BLDC GLUCOMTR-MCNC: 319 MG/DL (ref 70–130)
GLUCOSE BLDC GLUCOMTR-MCNC: 88 MG/DL (ref 70–130)
MAGNESIUM SERPL-MCNC: 2 MG/DL (ref 1.6–2.6)
POTASSIUM BLD-SCNC: 4.4 MMOL/L (ref 3.5–5.2)
SODIUM BLD-SCNC: 141 MMOL/L (ref 136–145)

## 2017-08-21 PROCEDURE — 80048 BASIC METABOLIC PNL TOTAL CA: CPT | Performed by: INTERNAL MEDICINE

## 2017-08-21 PROCEDURE — 63710000001 INSULIN ASPART PER 5 UNITS: Performed by: INTERNAL MEDICINE

## 2017-08-21 PROCEDURE — 82962 GLUCOSE BLOOD TEST: CPT

## 2017-08-21 PROCEDURE — 83735 ASSAY OF MAGNESIUM: CPT | Performed by: INTERNAL MEDICINE

## 2017-08-21 RX ADMIN — HYDROCODONE BITARTRATE AND ACETAMINOPHEN 1 TABLET: 7.5; 325 TABLET ORAL at 21:37

## 2017-08-21 RX ADMIN — HYDROCODONE BITARTRATE AND ACETAMINOPHEN 1 TABLET: 7.5; 325 TABLET ORAL at 15:32

## 2017-08-21 RX ADMIN — Medication 5 MG: at 21:37

## 2017-08-21 RX ADMIN — SILVER SULFADIAZINE: 10 CREAM TOPICAL at 21:38

## 2017-08-21 RX ADMIN — INSULIN DETEMIR 20 UNITS: 100 INJECTION, SOLUTION SUBCUTANEOUS at 08:29

## 2017-08-21 RX ADMIN — QUETIAPINE FUMARATE 100 MG: 100 TABLET, FILM COATED ORAL at 08:30

## 2017-08-21 RX ADMIN — SILVER SULFADIAZINE: 10 CREAM TOPICAL at 08:34

## 2017-08-21 RX ADMIN — INSULIN ASPART 4 UNITS: 100 INJECTION, SOLUTION INTRAVENOUS; SUBCUTANEOUS at 08:29

## 2017-08-21 RX ADMIN — INSULIN ASPART 4 UNITS: 100 INJECTION, SOLUTION INTRAVENOUS; SUBCUTANEOUS at 17:37

## 2017-08-21 RX ADMIN — BUTALBITAL, ACETAMINOPHEN, AND CAFFEINE 1 TABLET: 50; 325; 40 TABLET ORAL at 15:32

## 2017-08-21 RX ADMIN — QUETIAPINE FUMARATE 100 MG: 100 TABLET, FILM COATED ORAL at 21:36

## 2017-08-21 NOTE — NURSING NOTE
Endocrinology paged to see if they were going to see the patient today.  Dr. Yson called back and said that he was no longer seeing the patient and we needed to re call the service to page Dr. Kevin.   Dr. Kevin paged.
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
## 2017-08-21 NOTE — PAYOR COMM NOTE
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 voices are generally putting him down and he gets tired of them.      Patient is alert and O X 4.  He does not appear to be having any a/v hallucinations.  He had an excellent conversation w/ this clinician.  He was not concrete in his though processes.  He was " not looking about the room as if he was paranoid or seeing things that were not there.  There were no delayed responses in speech.  His thought were verbalized appropriately.  Patient had no SI of HI.  He was forward thinking.  He was intentionally trying to find housing.    At this time the plan per patient is to go back to [place] and try to secure housing from there.  He wants to contact [facility].  He will have to check w/ them to see if they will see someone w/o more permanent housing.  Patient is okay for discharge from psych perspective.           Electronically signed by Thierno Cordero at 8/20/2017  8:32 PM

## 2017-08-21 NOTE — PLAN OF CARE
Problem: Patient Care Overview (Adult)  Goal: Plan of Care Review  Outcome: Ongoing (interventions implemented as appropriate)    08/21/17 0409   Coping/Psychosocial Response Interventions   Plan Of Care Reviewed With patient   Patient Care Overview   Progress no change   Outcome Evaluation   Outcome Summary/Follow up Plan No problems during the night with compliance. , 5 units given, down to the 40s. VSS. No other changes.
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
## 2017-08-21 NOTE — PROGRESS NOTES
Met  W/ patient.  Behaviors and mood improved today.  Discussed coping skills.  Patient requested to talk to neurologist as he states he doesn't know what the results of the  MRI were.  Per RN he was told yesterday.  Patient also wanted to get his psych medications straightened out while here.  Patient explained that his outpatient provider would need to do that.  He indicated he wanted another psychiatrist see him.  Pt. was informed that Dr. Vegas is the only psychiatrist that is treating patients at this time.  He is future thinking. Pleasant.  He was not down as much on himself today.  He was surfing the internet on his phone.  No behaviors he is hearing voices.  He was appreciative of the visit.  Updated RN's on visit.

## 2017-08-21 NOTE — PROGRESS NOTES
Continued Stay Note  [facility]     Patient Name: Octavio Pham  MRN: [id number]  Today's Date: 8/21/2017    Admit Date: 8/15/2017          Discharge Plan       08/21/17 1509    Case Management/Social Work Plan    Plan Return to [facility]    Patient/Family In Agreement With Plan yes    Additional Comments Spoke with patient at bedside.  He confirms that plan remains for him to return to [facility] at [place].  Patient states he likes that [facility] has assisted him and that he plans to remain in [place].  CCP will follow as needed.               Discharge Codes     None            Becky S. [place], RN

## 2017-08-22 VITALS
SYSTOLIC BLOOD PRESSURE: 109 MMHG | DIASTOLIC BLOOD PRESSURE: 72 MMHG | BODY MASS INDEX: 20.38 KG/M2 | TEMPERATURE: 98.7 F | RESPIRATION RATE: 16 BRPM | HEIGHT: 63 IN | OXYGEN SATURATION: 96 % | HEART RATE: 82 BPM | WEIGHT: 115 LBS

## 2017-08-22 LAB
ANION GAP SERPL CALCULATED.3IONS-SCNC: 11.1 MMOL/L
BUN BLD-MCNC: 17 MG/DL (ref 6–20)
BUN/CREAT SERPL: 22.4 (ref 7–25)
CALCIUM SPEC-SCNC: 8.7 MG/DL (ref 8.6–10.5)
CHLORIDE SERPL-SCNC: 100 MMOL/L (ref 98–107)
CO2 SERPL-SCNC: 27.9 MMOL/L (ref 22–29)
CREAT BLD-MCNC: 0.76 MG/DL (ref 0.76–1.27)
GFR SERPL CREATININE-BSD FRML MDRD: 117 ML/MIN/1.73
GLUCOSE BLD-MCNC: 163 MG/DL (ref 65–99)
GLUCOSE BLDC GLUCOMTR-MCNC: 108 MG/DL (ref 70–130)
GLUCOSE BLDC GLUCOMTR-MCNC: 245 MG/DL (ref 70–130)
GLUCOSE BLDC GLUCOMTR-MCNC: 77 MG/DL (ref 70–130)
MAGNESIUM SERPL-MCNC: 1.9 MG/DL (ref 1.6–2.6)
POTASSIUM BLD-SCNC: 4 MMOL/L (ref 3.5–5.2)
SODIUM BLD-SCNC: 139 MMOL/L (ref 136–145)

## 2017-08-22 PROCEDURE — 80048 BASIC METABOLIC PNL TOTAL CA: CPT | Performed by: INTERNAL MEDICINE

## 2017-08-22 PROCEDURE — 25010000002 ENOXAPARIN PER 10 MG: Performed by: INTERNAL MEDICINE

## 2017-08-22 PROCEDURE — 63710000001 INSULIN ASPART PER 5 UNITS: Performed by: INTERNAL MEDICINE

## 2017-08-22 PROCEDURE — 82962 GLUCOSE BLOOD TEST: CPT

## 2017-08-22 PROCEDURE — 83735 ASSAY OF MAGNESIUM: CPT | Performed by: INTERNAL MEDICINE

## 2017-08-22 PROCEDURE — 99233 SBSQ HOSP IP/OBS HIGH 50: CPT | Performed by: INTERNAL MEDICINE

## 2017-08-22 RX ORDER — QUETIAPINE FUMARATE 100 MG/1
100 TABLET, FILM COATED ORAL EVERY 12 HOURS SCHEDULED
Qty: 60 TABLET | Refills: 2 | Status: SHIPPED | OUTPATIENT
Start: 2017-08-22 | End: 2017-09-24 | Stop reason: SDUPTHER

## 2017-08-22 RX ORDER — BUTALBITAL, ACETAMINOPHEN AND CAFFEINE 50; 325; 40 MG/1; MG/1; MG/1
1 TABLET ORAL EVERY 4 HOURS PRN
Qty: 10 TABLET | Refills: 0 | Status: SHIPPED | OUTPATIENT
Start: 2017-08-22

## 2017-08-22 RX ADMIN — QUETIAPINE FUMARATE 100 MG: 100 TABLET, FILM COATED ORAL at 08:11

## 2017-08-22 RX ADMIN — SILVER SULFADIAZINE: 10 CREAM TOPICAL at 08:11

## 2017-08-22 RX ADMIN — HYDROCODONE BITARTRATE AND ACETAMINOPHEN 1 TABLET: 7.5; 325 TABLET ORAL at 08:31

## 2017-08-22 RX ADMIN — INSULIN ASPART 4 UNITS: 100 INJECTION, SOLUTION INTRAVENOUS; SUBCUTANEOUS at 17:49

## 2017-08-22 RX ADMIN — INSULIN ASPART 4 UNITS: 100 INJECTION, SOLUTION INTRAVENOUS; SUBCUTANEOUS at 12:29

## 2017-08-22 RX ADMIN — HYDROCODONE BITARTRATE AND ACETAMINOPHEN 1 TABLET: 7.5; 325 TABLET ORAL at 14:23

## 2017-08-22 RX ADMIN — INSULIN ASPART 3 UNITS: 100 INJECTION, SOLUTION INTRAVENOUS; SUBCUTANEOUS at 08:12

## 2017-08-22 RX ADMIN — INSULIN ASPART 4 UNITS: 100 INJECTION, SOLUTION INTRAVENOUS; SUBCUTANEOUS at 08:12

## 2017-08-22 RX ADMIN — INSULIN DETEMIR 20 UNITS: 100 INJECTION, SOLUTION SUBCUTANEOUS at 08:10

## 2017-08-22 NOTE — PROGRESS NOTES
Continued Stay Note  [facility]     Patient Name: Octavio Pham  MRN: [id number]  Today's Date: 8/22/2017    Admit Date: 8/15/2017          Discharge Plan       08/22/17 4100    Case Management/Social Work Plan    Additional Comments Spoke with Zi Cruz from [place] and he confirms that patient can return to [place] at [place].  Will need cab voucher.  Patient asking about finding a new PCP.  I gave him the [employer] service number and explained to him that he will need to call them and have them assign him a new PCP.  CCP will follow.               Discharge Codes     None            Elvira Goodman RN
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
## 2017-08-22 NOTE — PROGRESS NOTES
"Spoke to pt's RN yashira who reports that pt very manipulative demanding her to get specialists to see him before going home and when \"doctor from endo was in room pt was not listening to doctor\" \"He told doctor from endo that he (pt) did not sleep last night and could not talk to doctor\" \"Endo went back in and asked if pt had any questions and pt said no\" Told Yashira that doctor [facility]ER wrote script for Seroquel for pt to take home. Will give script to Yashira BARNES. Interviewed pt who was directed to ask all questions about medications and treatment to doctor because RN can change medications w/o doctor order. Pt agreeable. Pt ill take all scripts to [facility] and they will rewrite scripts and give him his medications from their free formulary (pharmacy). Pt states he has \"no\" HI or SI currently.  "

## 2017-08-22 NOTE — PROGRESS NOTES
37 y.o.   LOS: 7 days   Patient Care Team:  No Known Provider as PCP - General    Chief Complaint:  Uncontrolled type 1 diabetes mellitus    Chief Complaint   Patient presents with   • Hyperglycemia     bg 448, +ketones   • Psychiatric Evaluation     depression and medication review       Subjective     HPI  Patient's blood sugars were stable in the 100-300 range  He is currently not in DKA  He reports that he could not sleep all night and is currently sleeping  He had no hypoglycemia  He is not sure if he wants to go home at this point since his psych medications are not fully adjusted       Interval History:     patient is a 37-year-old white male with a history of schizophrenia and uncontrolled type 1 diabetes mellitus since age 9 admitted to the hospital with nausea vomiting and hyperglycemia and was noted to be in diabetic ketoacidosis.  He was initially admitted to the intensive care unit with IV fluids and IV insulin and subsequently transferred to the regular floor and subcutaneous insulin regimen  Patient reported that he takes nearly 40 units of Levemir daily and also takes NovoLog 1 unit to every 4 carbs at the mealtime.  He also uses a correction scale of 1 unit for every 25/100.  He reports that he does not check his Accu-Cheks frequently  He also occasionally misses his insulin  He thinks he missed his insulin at least for a few days prior to hospitalization  Patient reports that he has schizophrenia and PTSD along with the depression and frequently cannot focus on his diabetes management      Hallucinations  Patient also reported previous episodes of suicidal ideation and attempts  Currently psychiatric as consulting on the patient  Uncontrolled type 1 diabetes mellitus with peripheral neuropathy  Patient reports symptoms of tingling and burning in both lower extremities  He has tried the gabapentin and Lyrica in the past but apparently Lyrica caused him suicidal ideation      Uncontrolled type 1  diabetes mellitus with retinopathy  Patient states he is legally blind in the left eye  Patient is unaware of diabetic nephropathy.  Review of Systems:      Review of Systems   Constitutional: Positive for fatigue.   Eyes: Positive for visual disturbance.   Respiratory: Negative.    Cardiovascular: Negative.    Gastrointestinal: Negative.    Neurological: Positive for numbness.   All other systems reviewed and are negative.    Objective     Vital Signs   Temp:  [98 °F (36.7 °C)-98.8 °F (37.1 °C)] 98.8 °F (37.1 °C)  Heart Rate:  [82-96] 82  Resp:  [16] 16  BP: ()/(57-92) 116/86    Physical Exam:  Physical Exam   Constitutional: He is oriented to person, place, and time.   HENT:   Head: Normocephalic and atraumatic.   Eyes: EOM are normal. Pupils are equal, round, and reactive to light.   Neck: Normal range of motion. Neck supple.   Cardiovascular: Normal rate, regular rhythm, normal heart sounds and intact distal pulses.    Pulmonary/Chest: Effort normal and breath sounds normal.   Abdominal: Soft. Bowel sounds are normal. He exhibits distension.   Musculoskeletal: Normal range of motion. He exhibits no edema.   Neurological: He is alert and oriented to person, place, and time.   Skin: Skin is warm and dry.   Psychiatric: He has a normal mood and affect. His behavior is normal.   Nursing note and vitals reviewed.  Results Review:     I reviewed the patient's new clinical results.      Glucose   Date/Time Value Ref Range Status   08/22/2017 0504 163 (H) 65 - 99 mg/dL Final   08/21/2017 0637 159 (H) 65 - 99 mg/dL Final   08/20/2017 1805 227 (H) 65 - 99 mg/dL Final   08/20/2017 0433 41 (C) 65 - 99 mg/dL Final     Lab Results (last 72 hours)     Procedure Component Value Units Date/Time    POC Glucose Fingerstick [[id number]]  (Abnormal) Collected:  08/19/17 1101    Specimen:  Blood Updated:  08/19/17 1103     Glucose 319 (H) mg/dL     Narrative:       Meter: [id number] : [id number] Sneha CLEMENTE    POC  Glucose Fingerstick [[id number]]  (Abnormal) Collected:  08/19/17 1634    Specimen:  Blood Updated:  08/19/17 1635     Glucose 145 (H) mg/dL     Narrative:       Meter: [id number] : [id number] Sneha CLEMENTE    POC Glucose Fingerstick [[id number]]  (Normal) Collected:  08/19/17 2047    Specimen:  Blood Updated:  08/19/17 2048     Glucose 96 mg/dL     Narrative:       Meter: [id number] : [id number] Mary Ann Ortiz    Magnesium [[id number]]  (Normal) Collected:  08/20/17 0433    Specimen:  Blood Updated:  08/20/17 0604     Magnesium 2.1 mg/dL     Basic Metabolic Panel [[id number]]  (Abnormal) Collected:  08/20/17 0433    Specimen:  Blood Updated:  08/20/17 0620     Glucose 41 (C) mg/dL      BUN 12 mg/dL      Creatinine 0.64 (L) mg/dL      Sodium 141 mmol/L      Potassium 3.9 mmol/L      Chloride 102 mmol/L      CO2 23.7 mmol/L      Calcium 9.2 mg/dL      eGFR Non African Amer 142 mL/min/1.73      BUN/Creatinine Ratio 18.8     Anion Gap 15.3 mmol/L     Narrative:       GFR Normal >60  Chronic Kidney Disease <60  Kidney Failure <15    POC Glucose Fingerstick [[id number]]  (Normal) Collected:  08/20/17 0621    Specimen:  Blood Updated:  08/20/17 0622     Glucose 87 mg/dL     Narrative:       Meter: [id number] : [id number] Teresita CLEMENTE    POC Glucose Fingerstick [[id number]]  (Abnormal) Collected:  08/20/17 0726    Specimen:  Blood Updated:  08/20/17 0727     Glucose 64 (L) mg/dL     Narrative:       Meter: [id number] : [id number] Pedro Pablo Mohan    POC Glucose Fingerstick [[id number]]  (Abnormal) Collected:  08/20/17 0826    Specimen:  Blood Updated:  08/20/17 0832     Glucose 190 (H) mg/dL     Narrative:       Meter: [id number] : [id number] Mary Beth Strong RN    POC Glucose Fingerstick [[id number]]  (Abnormal) Collected:  08/20/17 1141    Specimen:  Blood Updated:  08/20/17 1143     Glucose 433 (H) mg/dL     Narrative:       Meter: [id number] : [id number] Pedro Pablo Mohan    POC Glucose Fingerstick [[id number]]   (Abnormal) Collected:  08/20/17 1354    Specimen:  Blood Updated:  08/20/17 1355     Glucose 242 (H) mg/dL     Narrative:       Meter: [id number] : [id number] Pedro Pablo Kimberlee    POC Glucose Fingerstick [[id number]]  (Abnormal) Collected:  08/20/17 1639    Specimen:  Blood Updated:  08/20/17 1641     Glucose 57 (L) mg/dL     Narrative:       Meter: [id number] : [id number] Pedro Pablo Kimberlee    POC Glucose Fingerstick [[id number]]  (Abnormal) Collected:  08/20/17 1727    Specimen:  Blood Updated:  08/20/17 1732     Glucose 42 (C) mg/dL     Narrative:       INFORM NURSE Meter: [id number] : [id number] Pedro Pablo Kimberlee    POC Glucose Fingerstick [[id number]]  (Abnormal) Collected:  08/20/17 1751    Specimen:  Blood Updated:  08/20/17 1754     Glucose 203 (H) mg/dL     Narrative:       Meter: [id number] : [id number] Mary Beth Strong RN    Glucose, Random [[id number]]  (Abnormal) Collected:  08/20/17 1805    Specimen:  Blood Updated:  08/20/17 1838     Glucose 227 (H) mg/dL     POC Glucose Fingerstick [[id number]]  (Abnormal) Collected:  08/20/17 1939    Specimen:  Blood Updated:  08/20/17 1940     Glucose 340 (H) mg/dL     Narrative:       Meter: [id number] : [id number] Ronquillo Cece    POC Glucose Fingerstick [[id number]]  (Abnormal) Collected:  08/20/17 2350    Specimen:  Blood Updated:  08/20/17 2352     Glucose 46 (C) mg/dL     Narrative:       Meter: [id number] : [id number] Ronquillo Cece    POC Glucose Fingerstick [[id number]]  (Normal) Collected:  08/21/17 0016    Specimen:  Blood Updated:  08/21/17 0017     Glucose 124 mg/dL     Narrative:       Meter: [id number] : [id number] Javy Watts    Magnesium [[id number]]  (Normal) Collected:  08/21/17 0637    Specimen:  Blood Updated:  08/21/17 0734     Magnesium 2.0 mg/dL     Basic Metabolic Panel [[id number]]  (Abnormal) Collected:  08/21/17 0637    Specimen:  Blood Updated:  08/21/17 0735     Glucose 159 (H) mg/dL      BUN 13 mg/dL      Creatinine 0.84 mg/dL       Sodium 141 mmol/L      Potassium 4.4 mmol/L      Chloride 102 mmol/L      CO2 27.9 mmol/L      Calcium 9.0 mg/dL      eGFR Non African Amer 104 mL/min/1.73      BUN/Creatinine Ratio 15.5     Anion Gap 11.1 mmol/L     Narrative:       GFR Normal >60  Chronic Kidney Disease <60  Kidney Failure <15    POC Glucose Fingerstick [[id number]]  (Abnormal) Collected:  08/21/17 0813    Specimen:  Blood Updated:  08/21/17 0814     Glucose 146 (H) mg/dL     Narrative:       Meter: [id number] : [id number] Jaziel PHILLIPS    POC Glucose Fingerstick [[id number]]  (Normal) Collected:  08/21/17 1128    Specimen:  Blood Updated:  08/21/17 1129     Glucose 88 mg/dL     Narrative:       Meter: [id number] : [id number] Mary Beth Strong RN    POC Glucose Fingerstick [[id number]]  (Abnormal) Collected:  08/21/17 1636    Specimen:  Blood Updated:  08/21/17 1638     Glucose 319 (H) mg/dL     Narrative:       Meter: [id number] : [id number] Jaziel PHILLIPS    POC Glucose Fingerstick [[id number]]  (Abnormal) Collected:  08/21/17 1734    Specimen:  Blood Updated:  08/21/17 1736     Glucose 280 (H) mg/dL     Narrative:       Meter: [id number] : [id number] Mary Beth Strong RN    POC Glucose Fingerstick [[id number]]  (Normal) Collected:  08/21/17 2115    Specimen:  Blood Updated:  08/21/17 2116     Glucose 112 mg/dL     Narrative:       Meter: [id number] : [id number] Arnoldo Jimenez    Basic Metabolic Panel [[id number]]  (Abnormal) Collected:  08/22/17 0504    Specimen:  Blood Updated:  08/22/17 0601     Glucose 163 (H) mg/dL      BUN 17 mg/dL      Creatinine 0.76 mg/dL      Sodium 139 mmol/L      Potassium 4.0 mmol/L      Chloride 100 mmol/L      CO2 27.9 mmol/L      Calcium 8.7 mg/dL      eGFR Non African Amer 117 mL/min/1.73      BUN/Creatinine Ratio 22.4     Anion Gap 11.1 mmol/L     Narrative:       GFR Normal >60  Chronic Kidney Disease <60  Kidney Failure <15    Magnesium [[id number]]  (Normal) Collected:  08/22/17 0504    Specimen:   Blood Updated:  08/22/17 0601     Magnesium 1.9 mg/dL     POC Glucose Fingerstick [[id number]]  (Abnormal) Collected:  08/22/17 0739    Specimen:  Blood Updated:  08/22/17 0740     Glucose 245 (H) mg/dL     Narrative:       Meter: [id number] : [id number] Mohan        Imaging Results (last 72 hours)     Procedure Component Value Units Date/Time    CT Head Without Contrast [[id number]] Collected:  08/19/17 1933     Updated:  08/19/17 1933    Narrative:       CRANIAL CT SCAN WITHOUT CONTRAST     CLINICAL HISTORY: Headache; E10.10-Type 1 diabetes mellitus with  ketoacidosis without coma.     COMPARISON: None.     FINDINGS: Multiple axial images of the head were obtained without  contrast. There are no abnormal areas of increased density or mass  effect. Ventricles, sulci, and cisterns appear normal. Bone window  images show mild and chronic appearing mucosal thickening in the left  maxillary and bilateral ethmoid sinuses.       Impression:       No acute intracranial abnormality.         This study was performed with techniques to keep radiation doses as low  as reasonably achievable (ALARA). Individualized dose reduction  techniques using automated exposure control or adjustment of mA and/or  kV according to the patient size were employed.              Medication Review:       Current Facility-Administered Medications:   •  butalbital-acetaminophen-caffeine (FIORICET, ESGIC) -40 MG per tablet 1 tablet, 1 tablet, Oral, Q4H PRN, Chanel Leach MD, 1 tablet at 08/21/17 1532  •  dextrose (D50W) solution 12.5 g, 12.5 g, Intravenous, Q15 Min PRN, Artem Lara MD, 12.5 g at 08/20/17 1733  •  dextrose (GLUTOSE) oral gel 31 g, 31 g, Oral, Once, Chanel Leach MD  •  enoxaparin (LOVENOX) syringe 40 mg, 40 mg, Subcutaneous, Daily, David Doyle MD  •  HYDROcodone-acetaminophen (NORCO) 7.5-325 MG per tablet 1 tablet, 1 tablet, Oral, Q6H PRN, Chanel Leach MD, 1 tablet at 08/22/17 0831  •  insulin aspart  (novoLOG) injection 2-5 Units, 2-5 Units, Subcutaneous, 4x Daily AC & at Bedtime, Matthew HAIRSTON MD, 3 Units at 08/22/17 0812  •  insulin aspart (novoLOG) injection 4 Units, 4 Units, Subcutaneous, TID With Meals, Matthew HAIRSTON MD, 4 Units at 08/22/17 0812  •  insulin detemir (LEVEMIR) injection 20 Units, 20 Units, Subcutaneous, Daily, Chanel Leach MD, 20 Units at 08/22/17 0810  •  melatonin tablet 5 mg, 5 mg, Oral, Nightly, Chanel Leach MD, 5 mg at 08/21/17 2137  •  potassium chloride (MICRO-K) CR capsule 10 mEq, 10 mEq, Oral, PRN **OR** [DISCONTINUED] potassium chloride (KLOR-CON) packet 10 mEq, 10 mEq, Oral, PRN **OR** potassium chloride 10 mEq in 100 mL IVPB, 10 mEq, Intravenous, Q1H PRN, Artem Lara MD  •  potassium chloride (MICRO-K) CR capsule 20 mEq, 20 mEq, Oral, PRN **OR** [DISCONTINUED] potassium chloride (KLOR-CON) packet 20 mEq, 20 mEq, Oral, PRN **OR** potassium chloride 10 mEq in 100 mL IVPB, 10 mEq, Intravenous, Q1H PRN, Artem Lara MD  •  potassium chloride (MICRO-K) CR capsule 40 mEq, 40 mEq, Oral, PRN **OR** [DISCONTINUED] potassium chloride (KLOR-CON) packet 40 mEq, 40 mEq, Oral, PRN **OR** potassium chloride 10 mEq in 100 mL IVPB, 10 mEq, Intravenous, Q1H PRN, Artem Lara MD  •  QUEtiapine (SEROquel) tablet 100 mg, 100 mg, Oral, Q12H, Mikael Vegas III, MD, 100 mg at 08/22/17 0811  •  silver sulfadiazine (SILVADENE, SSD) 1 % cream, , Topical, Q12H, David Doyle MD  •  sodium chloride 0.45 % with KCl 20 mEq/L infusion, 250 mL/hr, Intravenous, Continuous PRN, Artem Lara MD, Last Rate: 250 mL/hr at 08/16/17 0159, 250 mL/hr at 08/16/17 0159  •  sodium chloride 0.9 % flush 1-10 mL, 1-10 mL, Intravenous, PRN, David Doyle MD  •  sodium chloride 0.9 % flush 10 mL, 10 mL, Intravenous, PRN, Artem Lara MD    Assessment/Plan     Patient Active Problem List   Diagnosis   • DKA (diabetic ketoacidoses)     #1 uncontrolled type 1 diabetes mellitus  "with hemoglobin A1c greater than 9.8%  #2 diabetic ketoacidosis appears to have resolved  #3 uncontrolled type 1 diabetes mellitus with neuropathy  #4 uncontrolled type 1 diabetes mellitus with retinopathy 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  #5 schizophrenia on medication   #6 hypoglycemia     Patient's blood sugars have stabilized over the past 2 days  Patient is noncompliant with the diet and insulin at home  He gives schizophrenia and lack of focus is the reason for noncompliance    Patient is currently receiving NovoLog 4 units 3 times a day along with a sliding scale and the Levemir 20 units in the morning  These are much smaller doses than at home but patient remained stable on this regimen for now  I recommend that patient continue this regimen at home and if his blood sugars are elevated and he may gradually return to home regimen     Patient will follow-up with his primary care physician     The total time spent for old record and lab review and floor time was more than 35 min of which greater than 20 min of time was spent on counseling the patient on recommended evaluation and treatment options, instructions for management/treatment and /or follow up  and importance of compliance with chosen management or treatment options    Matthew Barlow MD FACE.  08/22/17  9:20 AM      EMR Dragon / transcription disclaimer:     \"Dictated utilizing Dragon dictation\".   "

## 2017-08-22 NOTE — NURSING NOTE
Spoke with patient regarding discharge and getting discharge medications. Pt states will be able to get all medications fills: Humalog, Levemir, Seroquel, Fioricet. Pt given dose of Seroquel for this evening and given 2 Levemir pins. Pt will be transported to [facility] via cab. Will get prescriptions filled at [facility] this evening.   MADELINE.

## 2017-08-22 NOTE — PAYOR COMM NOTE
"Octavio Pham (37 y.o. Male)                                            REF# [id number]                                              CONTACT=   RICKI JULIEN                                                     FAX  [phone]                                                            [phone]                   JB   ATTENDING NOT SEEN     Date of Birth Social Security Number Address Home Phone MRN    1980  pt does not know address  [place] [address]  [id number]    Anglican Marital Status          Non-Christian Single       Admission Date Admission Type Admitting Provider Attending Provider Department, Room/Bed    8/15/17 Emergency David Doyle MD Kellie, Brandon John, MD [address], 606/1    Discharge Date Discharge Disposition Discharge Destination                      Attending Provider: David Doyle MD     Allergies:  Codeine, Gabapentin, Geodon [Ziprasidone Hcl], Haldol [Haloperidol Lactate], Lyrica [Pregabalin], Risperidone And Related, Thorazine [Chlorpromazine], Tramadol, Trazodone And Nefazodone    Isolation:  None   Infection:  None   Code Status:  FULL    Ht:  62.99\" (160 cm)   Wt:  115 lb (52.2 kg)    Admission Cmt:  None   Principal Problem:  None                Active Insurance as of 8/15/2017     Primary Coverage     Payor Plan Insurance Group Employer/Plan Group    PASSPORT PASSPORT      Payor Plan Address Payor Plan Phone Number Effective From Effective To    [address] [phone] 7/13/2017     [place] [address]       Subscriber Name Subscriber Birth Date Member ID       OCTAVIO PHAM 1980 [id number]                 Emergency Contacts      (Rel.) Home Phone Work Phone Mobile Phone    Zi Cruz (Other) -- -- [phone]            Insurance Information                PASSPORT/PASSPORT Phone: [phone]    Subscriber: Ankit Octavio Subscriber#: [id number]    Group#:  Precert#:           Vital Signs (last 24 " hours)       08/21 0700  -  08/22 0659 08/22 0700  -  08/22 1506   Most Recent    Temp (°F) 98 -  98.5    98.7 -  98.8     98.7 (37.1)    Heart Rate 82 -  96       82    Resp   16      16     16    BP (!)87/57 -  124/92    109/72 -  116/86     109/72    SpO2 (%)   96                 Hospital Medications (active)       Dose Frequency Start End    butalbital-acetaminophen-caffeine (FIORICET, ESGIC) -40 MG per tablet 1 tablet 1 tablet Every 4 Hours PRN 8/16/2017     Sig - Route: Take 1 tablet by mouth Every 4 (Four) Hours As Needed for Headache. - Oral    dextrose (D50W) solution 12.5 g 12.5 g Every 15 Minutes PRN 8/15/2017     Sig - Route: Infuse 25 mL into a venous catheter Every 15 (Fifteen) Minutes As Needed for Low Blood Sugar (for blood glucose < 100 mg/dL). - Intravenous    dextrose (GLUTOSE) oral gel 31 g 31 g Once 8/17/2017     Sig - Route: Take 31 g by mouth 1 (One) Time. - Oral    enoxaparin (LOVENOX) syringe 40 mg 40 mg Daily 8/16/2017     Sig - Route: Inject 0.4 mL under the skin Daily. - Subcutaneous    HYDROcodone-acetaminophen (NORCO) 7.5-325 MG per tablet 1 tablet 1 tablet Every 6 Hours PRN 8/16/2017 8/26/2017    Sig - Route: Take 1 tablet by mouth Every 6 (Six) Hours As Needed for Moderate Pain . - Oral    insulin aspart (novoLOG) injection 2-5 Units 2-5 Units 4 Times Daily Before Meals & Nightly 8/17/2017     Sig - Route: Inject 2-5 Units under the skin 4 (Four) Times a Day Before Meals & at Bedtime. - Subcutaneous    insulin aspart (novoLOG) injection 4 Units 4 Units 3 Times Daily With Meals 8/18/2017     Sig - Route: Inject 4 Units under the skin 3 (Three) Times a Day With Meals. - Subcutaneous    insulin detemir (LEVEMIR) injection 20 Units 20 Units Daily 8/17/2017     Sig - Route: Inject 20 Units under the skin Daily. - Subcutaneous    melatonin tablet 5 mg 5 mg Nightly 8/17/2017     Sig - Route: Take 1 tablet by mouth Every Night. - Oral    potassium chloride (MICRO-K) CR capsule 10 mEq 10  "mEq As Needed 8/15/2017     Sig - Route: Take 1 capsule by mouth As Needed (Potassium replacement per admin instructions). - Oral    Linked Group 1:  \"Or\" Linked Group Details        potassium chloride (MICRO-K) CR capsule 20 mEq 20 mEq As Needed 8/15/2017     Sig - Route: Take 2 capsules by mouth As Needed (Potassium replacement per admin instructions). - Oral    Linked Group 2:  \"Or\" Linked Group Details        potassium chloride (MICRO-K) CR capsule 40 mEq 40 mEq As Needed 8/15/2017     Sig - Route: Take 4 capsules by mouth As Needed (Potassium replacement per admin instructions). - Oral    Linked Group 3:  \"Or\" Linked Group Details        potassium chloride 10 mEq in 100 mL IVPB 10 mEq Every 1 Hour PRN 8/15/2017     Sig - Route: Infuse 100 mL into a venous catheter Every 1 (One) Hour As Needed (Potassium replacement per admin instructions). - Intravenous    Linked Group 3:  \"Or\" Linked Group Details        potassium chloride 10 mEq in 100 mL IVPB 10 mEq Every 1 Hour PRN 8/15/2017     Sig - Route: Infuse 100 mL into a venous catheter Every 1 (One) Hour As Needed (Potassium replacement per admin instructions). - Intravenous    Linked Group 2:  \"Or\" Linked Group Details        potassium chloride 10 mEq in 100 mL IVPB 10 mEq Every 1 Hour PRN 8/15/2017     Sig - Route: Infuse 100 mL into a venous catheter Every 1 (One) Hour As Needed (Potassium replacement per admin instructions). - Intravenous    Linked Group 1:  \"Or\" Linked Group Details        QUEtiapine (SEROquel) tablet 100 mg 100 mg Every 12 Hours Scheduled 8/19/2017     Sig - Route: Take 1 tablet by mouth Every 12 (Twelve) Hours. - Oral    silver sulfadiazine (SILVADENE, SSD) 1 % cream  Every 12 Hours Scheduled 8/16/2017     Sig - Route: Apply  topically Every 12 (Twelve) Hours. - Topical    Cosign for Ordering: Required by David Doyle MD    sodium chloride 0.45 % with KCl 20 mEq/L infusion 250 mL/hr Continuous PRN 8/15/2017     Sig - Route: Infuse " 250 mL/hr into a venous catheter Continuous As Needed (After Initial 2 Hours - If Potassium Level is Less Than or Equal to 5 (If Greater Than 5 use 0.45%)). - Intravenous    sodium chloride 0.9 % flush 1-10 mL 1-10 mL As Needed 8/16/2017     Sig - Route: Infuse 1-10 mL into a venous catheter As Needed for Line Care. - Intravenous    sodium chloride 0.9 % flush 10 mL 10 mL As Needed 8/15/2017     Sig - Route: Infuse 10 mL into a venous catheter As Needed for Line Care. - Intravenous    Cosign for Ordering: Accepted by Artem Lara MD on 8/16/2017  1:41 AM            Lab Results (last 24 hours)     Procedure Component Value Units Date/Time    POC Glucose Fingerstick [[id number]]  (Abnormal) Collected:  08/21/17 1636    Specimen:  Blood Updated:  08/21/17 1638     Glucose 319 (H) mg/dL     Narrative:       Meter: [id number] : [id number] Jaziel PHILLIPS    POC Glucose Fingerstick [[id number]]  (Abnormal) Collected:  08/21/17 1734    Specimen:  Blood Updated:  08/21/17 1736     Glucose 280 (H) mg/dL     Narrative:       Meter: [id number] : [id number] Mary Beth Strong RN    POC Glucose Fingerstick [[id number]]  (Normal) Collected:  08/21/17 2115    Specimen:  Blood Updated:  08/21/17 2116     Glucose 112 mg/dL     Narrative:       Meter: [id number] : [id number] Arnoldo Jimenez    Basic Metabolic Panel [[id number]]  (Abnormal) Collected:  08/22/17 0504    Specimen:  Blood Updated:  08/22/17 0601     Glucose 163 (H) mg/dL      BUN 17 mg/dL      Creatinine 0.76 mg/dL      Sodium 139 mmol/L      Potassium 4.0 mmol/L      Chloride 100 mmol/L      CO2 27.9 mmol/L      Calcium 8.7 mg/dL      eGFR Non African Amer 117 mL/min/1.73      BUN/Creatinine Ratio 22.4     Anion Gap 11.1 mmol/L     Narrative:       GFR Normal >60  Chronic Kidney Disease <60  Kidney Failure <15    Magnesium [[id number]]  (Normal) Collected:  08/22/17 0504    Specimen:  Blood Updated:  08/22/17 0601     Magnesium 1.9 mg/dL     POC Glucose Fingerstick  [[id number]]  (Abnormal) Collected:  08/22/17 0739    Specimen:  Blood Updated:  08/22/17 0740     Glucose 245 (H) mg/dL     Narrative:       Meter: [id number] : [id number] Pedro Pablo Mohan    POC Glucose Fingerstick [[id number]]  (Normal) Collected:  08/22/17 1156    Specimen:  Blood Updated:  08/22/17 1157     Glucose 108 mg/dL     Narrative:       Meter: [id number] : [id number] Pedro Pablo Mohan           Physician Progress Notes (last 24 hours) (Notes from 8/21/2017  3:06 PM through 8/22/2017  3:06 PM)      Matthew HAIRSTON MD at 8/22/2017  9:20 AM  Version 2 of 2         37 y.o.   LOS: 7 days   Patient Care Team:  No Known Provider as PCP - General    Chief Complaint:  Uncontrolled type 1 diabetes mellitus    Chief Complaint   Patient presents with   • Hyperglycemia     bg 448, +ketones   • Psychiatric Evaluation     depression and medication review       Subjective     HPI  Patient's blood sugars were stable in the 100-300 range  He is currently not in DKA  He reports that he could not sleep all night and is currently sleeping  He had no hypoglycemia  He is not sure if he wants to go home at this point since his psych medications are not fully adjusted       Interval History:     patient is a 37-year-old white male with a history of schizophrenia and uncontrolled type 1 diabetes mellitus since age 9 admitted to the hospital with nausea vomiting and hyperglycemia and was noted to be in diabetic ketoacidosis.  He was initially admitted to the intensive care unit with IV fluids and IV insulin and subsequently transferred to the regular floor and subcutaneous insulin regimen  Patient reported that he takes nearly 40 units of Levemir daily and also takes NovoLog 1 unit to every 4 carbs at the mealtime.  He also uses a correction scale of 1 unit for every 25/100.  He reports that he does not check his Accu-Cheks frequently  He also occasionally misses his insulin  He thinks he missed his insulin at least for a  few days prior to hospitalization  Patient reports that he has schizophrenia and PTSD along with the depression and frequently cannot focus on his diabetes management      Hallucinations  Patient also reported previous episodes of suicidal ideation and attempts  Currently psychiatric as consulting on the patient  Uncontrolled type 1 diabetes mellitus with peripheral neuropathy  Patient reports symptoms of tingling and burning in both lower extremities  He has tried the gabapentin and Lyrica in the past but apparently Lyrica caused him suicidal ideation      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  Review of Systems:      Review of Systems   Constitutional: Positive for fatigue.   Eyes: Positive for visual disturbance.   Respiratory: Negative.    Cardiovascular: Negative.    Gastrointestinal: Negative.    Neurological: Positive for numbness.   All other systems reviewed and are negative.    Objective     Vital Signs   Temp:  [98 °F (36.7 °C)-98.8 °F (37.1 °C)] 98.8 °F (37.1 °C)  Heart Rate:  [82-96] 82  Resp:  [16] 16  BP: ()/(57-92) 116/86    Physical Exam:  Physical Exam   Constitutional: He is oriented to person, place, and time.   HENT:   Head: Normocephalic and atraumatic.   Eyes: EOM are normal. Pupils are equal, round, and reactive to light.   Neck: Normal range of motion. Neck supple.   Cardiovascular: Normal rate, regular rhythm, normal heart sounds and intact distal pulses.    Pulmonary/Chest: Effort normal and breath sounds normal.   Abdominal: Soft. Bowel sounds are normal. He exhibits distension.   Musculoskeletal: Normal range of motion. He exhibits no edema.   Neurological: He is alert and oriented to person, place, and time.   Skin: Skin is warm and dry.   Psychiatric: He has a normal mood and affect. His behavior is normal.   Nursing note and vitals reviewed.  Results Review:     I  reviewed the patient's new clinical results.      Glucose   Date/Time Value Ref Range Status   08/22/2017 0504 163 (H) 65 - 99 mg/dL Final   08/21/2017 0637 159 (H) 65 - 99 mg/dL Final   08/20/2017 1805 227 (H) 65 - 99 mg/dL Final   08/20/2017 0433 41 (C) 65 - 99 mg/dL Final     Lab Results (last 72 hours)     Procedure Component Value Units Date/Time    POC Glucose Fingerstick [[id number]]  (Abnormal) Collected:  08/19/17 1101    Specimen:  Blood Updated:  08/19/17 1103     Glucose 319 (H) mg/dL     Narrative:       Meter: [id number] : [id number] Muguruza Wendoly NA    POC Glucose Fingerstick [[id number]]  (Abnormal) Collected:  08/19/17 1634    Specimen:  Blood Updated:  08/19/17 1635     Glucose 145 (H) mg/dL     Narrative:       Meter: [id number] : [id number] Muguruza Wendoly NA    POC Glucose Fingerstick [[id number]]  (Normal) Collected:  08/19/17 2047    Specimen:  Blood Updated:  08/19/17 2048     Glucose 96 mg/dL     Narrative:       Meter: [id number] : [id number] Reese Nicolasperta    Magnesium [[id number]]  (Normal) Collected:  08/20/17 0433    Specimen:  Blood Updated:  08/20/17 0604     Magnesium 2.1 mg/dL     Basic Metabolic Panel [[id number]]  (Abnormal) Collected:  08/20/17 0433    Specimen:  Blood Updated:  08/20/17 0620     Glucose 41 (C) mg/dL      BUN 12 mg/dL      Creatinine 0.64 (L) mg/dL      Sodium 141 mmol/L      Potassium 3.9 mmol/L      Chloride 102 mmol/L      CO2 23.7 mmol/L      Calcium 9.2 mg/dL      eGFR Non African Amer 142 mL/min/1.73      BUN/Creatinine Ratio 18.8     Anion Gap 15.3 mmol/L     Narrative:       GFR Normal >60  Chronic Kidney Disease <60  Kidney Failure <15    POC Glucose Fingerstick [[id number]]  (Normal) Collected:  08/20/17 0621    Specimen:  Blood Updated:  08/20/17 0622     Glucose 87 mg/dL     Narrative:       Meter: [id number] : [id number] Teresita CLEMENTE    POC Glucose Fingerstick [[id number]]  (Abnormal) Collected:  08/20/17 0726    Specimen:  Blood  Updated:  08/20/17 0727     Glucose 64 (L) mg/dL     Narrative:       Meter: [id number] : [id number] Pedro Pablo Kimberlee    POC Glucose Fingerstick [[id number]]  (Abnormal) Collected:  08/20/17 0826    Specimen:  Blood Updated:  08/20/17 0832     Glucose 190 (H) mg/dL     Narrative:       Meter: [id number] : [id number] Mary Beth Strong RN    POC Glucose Fingerstick [[id number]]  (Abnormal) Collected:  08/20/17 1141    Specimen:  Blood Updated:  08/20/17 1143     Glucose 433 (H) mg/dL     Narrative:       Meter: [id number] : [id number] Pedro Pablo Kimberlee    POC Glucose Fingerstick [[id number]]  (Abnormal) Collected:  08/20/17 1354    Specimen:  Blood Updated:  08/20/17 1355     Glucose 242 (H) mg/dL     Narrative:       Meter: [id number] : [id number] Pedro Pablo Kimberlee    POC Glucose Fingerstick [[id number]]  (Abnormal) Collected:  08/20/17 1639    Specimen:  Blood Updated:  08/20/17 1641     Glucose 57 (L) mg/dL     Narrative:       Meter: [id number] : [id number] Pedro Pablo Kimberlee    POC Glucose Fingerstick [[id number]]  (Abnormal) Collected:  08/20/17 1727    Specimen:  Blood Updated:  08/20/17 1732     Glucose 42 (C) mg/dL     Narrative:       INFORM NURSE Meter: [id number] : [id number] Pedro Pablo Kimberlee    POC Glucose Fingerstick [[id number]]  (Abnormal) Collected:  08/20/17 1751    Specimen:  Blood Updated:  08/20/17 1754     Glucose 203 (H) mg/dL     Narrative:       Meter: [id number] : [id number] Mary Beth Strong RN    Glucose, Random [[id number]]  (Abnormal) Collected:  08/20/17 1805    Specimen:  Blood Updated:  08/20/17 1838     Glucose 227 (H) mg/dL     POC Glucose Fingerstick [[id number]]  (Abnormal) Collected:  08/20/17 1939    Specimen:  Blood Updated:  08/20/17 1940     Glucose 340 (H) mg/dL     Narrative:       Meter: [id number] : [id number] Yg Zhao    POC Glucose Fingerstick [[id number]]  (Abnormal) Collected:  08/20/17 2350    Specimen:  Blood Updated:  08/20/17 2352     Glucose 46 (C) mg/dL     Narrative:        Meter: [id number] : [id number] Yg Zhao    POC Glucose Fingerstick [[id number]]  (Normal) Collected:  08/21/17 0016    Specimen:  Blood Updated:  08/21/17 0017     Glucose 124 mg/dL     Narrative:       Meter: [id number] : [id number] Javy Stefanie    Magnesium [[id number]]  (Normal) Collected:  08/21/17 0637    Specimen:  Blood Updated:  08/21/17 0734     Magnesium 2.0 mg/dL     Basic Metabolic Panel [[id number]]  (Abnormal) Collected:  08/21/17 0637    Specimen:  Blood Updated:  08/21/17 0735     Glucose 159 (H) mg/dL      BUN 13 mg/dL      Creatinine 0.84 mg/dL      Sodium 141 mmol/L      Potassium 4.4 mmol/L      Chloride 102 mmol/L      CO2 27.9 mmol/L      Calcium 9.0 mg/dL      eGFR Non African Amer 104 mL/min/1.73      BUN/Creatinine Ratio 15.5     Anion Gap 11.1 mmol/L     Narrative:       GFR Normal >60  Chronic Kidney Disease <60  Kidney Failure <15    POC Glucose Fingerstick [[id number]]  (Abnormal) Collected:  08/21/17 0813    Specimen:  Blood Updated:  08/21/17 0814     Glucose 146 (H) mg/dL     Narrative:       Meter: [id number] : [id number] Jaziel PHILLIPS    POC Glucose Fingerstick [[id number]]  (Normal) Collected:  08/21/17 1128    Specimen:  Blood Updated:  08/21/17 1129     Glucose 88 mg/dL     Narrative:       Meter: [id number] : [id number] Mary Beth Strong RN    POC Glucose Fingerstick [[id number]]  (Abnormal) Collected:  08/21/17 1636    Specimen:  Blood Updated:  08/21/17 1638     Glucose 319 (H) mg/dL     Narrative:       Meter: [id number] : [id number] Jaziel Sutherland S    POC Glucose Fingerstick [[id number]]  (Abnormal) Collected:  08/21/17 1734    Specimen:  Blood Updated:  08/21/17 1736     Glucose 280 (H) mg/dL     Narrative:       Meter: [id number] : [id number] Mary Beth Strong RN    POC Glucose Fingerstick [[id number]]  (Normal) Collected:  08/21/17 2115    Specimen:  Blood Updated:  08/21/17 2116     Glucose 112 mg/dL     Narrative:       Meter: [id number] :  [id number] Arnoldo Jimenez    Basic Metabolic Panel [[id number]]  (Abnormal) Collected:  08/22/17 0504    Specimen:  Blood Updated:  08/22/17 0601     Glucose 163 (H) mg/dL      BUN 17 mg/dL      Creatinine 0.76 mg/dL      Sodium 139 mmol/L      Potassium 4.0 mmol/L      Chloride 100 mmol/L      CO2 27.9 mmol/L      Calcium 8.7 mg/dL      eGFR Non African Amer 117 mL/min/1.73      BUN/Creatinine Ratio 22.4     Anion Gap 11.1 mmol/L     Narrative:       GFR Normal >60  Chronic Kidney Disease <60  Kidney Failure <15    Magnesium [[id number]]  (Normal) Collected:  08/22/17 0504    Specimen:  Blood Updated:  08/22/17 0601     Magnesium 1.9 mg/dL     POC Glucose Fingerstick [[id number]]  (Abnormal) Collected:  08/22/17 0739    Specimen:  Blood Updated:  08/22/17 0740     Glucose 245 (H) mg/dL     Narrative:       Meter: [id number] : [id number] Pedro Pablo Mohan          Assessment/Plan     Patient Active Problem List   Diagnosis   • DKA (diabetic ketoacidoses)     #1 uncontrolled type 1 diabetes mellitus with hemoglobin A1c greater than 9.8%  #2 diabetic ketoacidosis appears to have resolved  #3 uncontrolled type 1 diabetes mellitus with neuropathy  #4 uncontrolled type 1 diabetes mellitus with retinopathy 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  #5 schizophrenia on medication   #6 hypoglycemia     Patient's blood sugars have stabilized over the past 2 days  Patient is noncompliant with the diet and insulin at home  He gives schizophrenia and lack of focus is the reason for noncompliance    Patient is currently receiving NovoLog 4 units 3 times a day along with a sliding scale and the Levemir 20 units in the morning  These are much smaller doses than at home but patient remained stable on this regimen for now  I recommend that patient continue this regimen at home and if his blood sugars are elevated and he may gradually return to home regimen     Patient will follow-up with his primary care physician     The total time spent for old  "record and lab review and floor time was more than 35 min of which greater than 20 min of time was spent on counseling the patient on recommended evaluation and treatment options, instructions for management/treatment and /or follow up  and importance of compliance with chosen management or treatment options    Matthew Barlow MD FACE.  08/22/17  9:20 AM      EMR Dragon / transcription disclaimer:     \"Dictated utilizing Dragon dictation\".      Electronically signed by Matthew HAIRSTON MD at 8/22/2017  9:47 AM      Matthew HAIRSTON MD at 8/22/2017  9:20 AM  Version 1 of 2         37 y.o.   LOS: 7 days   Patient Care Team:  No Known Provider as PCP - General    Chief Complaint:  Uncontrolled type 1 diabetes mellitus    Chief Complaint   Patient presents with   • Hyperglycemia     bg 448, +ketones   • Psychiatric Evaluation     depression and medication review       Subjective     HPI  Patient's blood sugars were stable in the 100-300 range  He is currently not in DKA  He reports that he could not sleep all night and is currently sleeping  He had no hypoglycemia  He is not sure if he wants to go home at this point since his psych medications are not fully adjusted       Interval History:     patient is a 37-year-old white male with a history of schizophrenia and uncontrolled type 1 diabetes mellitus since age 9 admitted to the hospital with nausea vomiting and hyperglycemia and was noted to be in diabetic ketoacidosis.  He was initially admitted to the intensive care unit with IV fluids and IV insulin and subsequently transferred to the regular floor and subcutaneous insulin regimen  Patient reported that he takes nearly 40 units of Levemir daily and also takes NovoLog 1 unit to every 4 carbs at the mealtime.  He also uses a correction scale of 1 unit for every 25/100.  He reports that he does not check his Accu-Cheks frequently  He also occasionally misses his insulin  He thinks he missed his " insulin at least for a few days prior to hospitalization  Patient reports that he has schizophrenia and PTSD along with the depression and frequently cannot focus on his diabetes management      Hallucinations  Patient also reported previous episodes of suicidal ideation and attempts  Currently psychiatric as consulting on the patient  Uncontrolled type 1 diabetes mellitus with peripheral neuropathy  Patient reports symptoms of tingling and burning in both lower extremities  He has tried the gabapentin and Lyrica in the past but apparently Lyrica caused him suicidal ideation      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  Review of Systems:      Review of Systems   Constitutional: Positive for fatigue.   Eyes: Positive for visual disturbance.   Respiratory: Negative.    Cardiovascular: Negative.    Gastrointestinal: Negative.    Neurological: Positive for numbness.   All other systems reviewed and are negative.    Objective     Vital Signs   Temp:  [98 °F (36.7 °C)-98.8 °F (37.1 °C)] 98.8 °F (37.1 °C)  Heart Rate:  [82-96] 82  Resp:  [16] 16  BP: ()/(57-92) 116/86    Physical Exam:  Physical Exam   Constitutional: He is oriented to person, place, and time.   HENT:   Head: Normocephalic and atraumatic.   Eyes: EOM are normal. Pupils are equal, round, and reactive to light.   Neck: Normal range of motion. Neck supple.   Cardiovascular: Normal rate, regular rhythm, normal heart sounds and intact distal pulses.    Pulmonary/Chest: Effort normal and breath sounds normal.   Abdominal: Soft. Bowel sounds are normal. He exhibits distension.   Musculoskeletal: Normal range of motion. He exhibits no edema.   Neurological: He is alert and oriented to person, place, and time.   Skin: Skin is warm and dry.   Psychiatric: He has a normal mood and affect. His behavior is normal.   Nursing note and vitals  reviewed.  Results Review:     I reviewed the patient's new clinical results.      Glucose   Date/Time Value Ref Range Status   08/22/2017 0504 163 (H) 65 - 99 mg/dL Final   08/21/2017 0637 159 (H) 65 - 99 mg/dL Final   08/20/2017 1805 227 (H) 65 - 99 mg/dL Final   08/20/2017 0433 41 (C) 65 - 99 mg/dL Final     Lab Results (last 72 hours)     Procedure Component Value Units Date/Time    POC Glucose Fingerstick [[id number]]  (Abnormal) Collected:  08/19/17 1101    Specimen:  Blood Updated:  08/19/17 1103     Glucose 319 (H) mg/dL     Narrative:       Meter: [id number] : [id number] Muguruza Wendoly NA    POC Glucose Fingerstick [[id number]]  (Abnormal) Collected:  08/19/17 1634    Specimen:  Blood Updated:  08/19/17 1635     Glucose 145 (H) mg/dL     Narrative:       Meter: [id number] : [id number] Muguruza Wendoly NA    POC Glucose Fingerstick [[id number]]  (Normal) Collected:  08/19/17 2047    Specimen:  Blood Updated:  08/19/17 2048     Glucose 96 mg/dL     Narrative:       Meter: [id number] : [id number] Reese Ruperta    Magnesium [[id number]]  (Normal) Collected:  08/20/17 0433    Specimen:  Blood Updated:  08/20/17 0604     Magnesium 2.1 mg/dL     Basic Metabolic Panel [[id number]]  (Abnormal) Collected:  08/20/17 0433    Specimen:  Blood Updated:  08/20/17 0620     Glucose 41 (C) mg/dL      BUN 12 mg/dL      Creatinine 0.64 (L) mg/dL      Sodium 141 mmol/L      Potassium 3.9 mmol/L      Chloride 102 mmol/L      CO2 23.7 mmol/L      Calcium 9.2 mg/dL      eGFR Non African Amer 142 mL/min/1.73      BUN/Creatinine Ratio 18.8     Anion Gap 15.3 mmol/L     Narrative:       GFR Normal >60  Chronic Kidney Disease <60  Kidney Failure <15    POC Glucose Fingerstick [[id number]]  (Normal) Collected:  08/20/17 0621    Specimen:  Blood Updated:  08/20/17 0622     Glucose 87 mg/dL     Narrative:       Meter: [id number] : [id number] Teresita CLEMENTE    POC Glucose Fingerstick [[id number]]  (Abnormal) Collected:  08/20/17  0726    Specimen:  Blood Updated:  08/20/17 0727     Glucose 64 (L) mg/dL     Narrative:       Meter: [id number] : [id number] Pedro Pablo Kimberlee    POC Glucose Fingerstick [[id number]]  (Abnormal) Collected:  08/20/17 0826    Specimen:  Blood Updated:  08/20/17 0832     Glucose 190 (H) mg/dL     Narrative:       Meter: [id number] : [id number] Mary Beth Strong RN    POC Glucose Fingerstick [[id number]]  (Abnormal) Collected:  08/20/17 1141    Specimen:  Blood Updated:  08/20/17 1143     Glucose 433 (H) mg/dL     Narrative:       Meter: [id number] : [id number] Pedro Pablo Kimberlee    POC Glucose Fingerstick [[id number]]  (Abnormal) Collected:  08/20/17 1354    Specimen:  Blood Updated:  08/20/17 1355     Glucose 242 (H) mg/dL     Narrative:       Meter: [id number] : [id number] Pedro Pablo Kimberlee    POC Glucose Fingerstick [[id number]]  (Abnormal) Collected:  08/20/17 1639    Specimen:  Blood Updated:  08/20/17 1641     Glucose 57 (L) mg/dL     Narrative:       Meter: [id number] : [id number] Pedro Pablo Kimberlee    POC Glucose Fingerstick [[id number]]  (Abnormal) Collected:  08/20/17 1727    Specimen:  Blood Updated:  08/20/17 1732     Glucose 42 (C) mg/dL     Narrative:       INFORM NURSE Meter: [id number] : [id number] Pedro Pablo Kimberlee    POC Glucose Fingerstick [[id number]]  (Abnormal) Collected:  08/20/17 1751    Specimen:  Blood Updated:  08/20/17 1754     Glucose 203 (H) mg/dL     Narrative:       Meter: [id number] : [id number] Mary Beth Strong RN    Glucose, Random [[id number]]  (Abnormal) Collected:  08/20/17 1805    Specimen:  Blood Updated:  08/20/17 1838     Glucose 227 (H) mg/dL     POC Glucose Fingerstick [[id number]]  (Abnormal) Collected:  08/20/17 1939    Specimen:  Blood Updated:  08/20/17 1940     Glucose 340 (H) mg/dL     Narrative:       Meter: [id number] : [id number] Yg Zhao    POC Glucose Fingerstick [[id number]]  (Abnormal) Collected:  08/20/17 2350    Specimen:  Blood Updated:  08/20/17 2352     Glucose 46  (C) mg/dL     Narrative:       Meter: [id number] : [id number] Yg Zhao    POC Glucose Fingerstick [[id number]]  (Normal) Collected:  08/21/17 0016    Specimen:  Blood Updated:  08/21/17 0017     Glucose 124 mg/dL     Narrative:       Meter: [id number] : [id number] Javy Watts    Magnesium [[id number]]  (Normal) Collected:  08/21/17 0637    Specimen:  Blood Updated:  08/21/17 0734     Magnesium 2.0 mg/dL     Basic Metabolic Panel [[id number]]  (Abnormal) Collected:  08/21/17 0637    Specimen:  Blood Updated:  08/21/17 0735     Glucose 159 (H) mg/dL      BUN 13 mg/dL      Creatinine 0.84 mg/dL      Sodium 141 mmol/L      Potassium 4.4 mmol/L      Chloride 102 mmol/L      CO2 27.9 mmol/L      Calcium 9.0 mg/dL      eGFR Non African Amer 104 mL/min/1.73      BUN/Creatinine Ratio 15.5     Anion Gap 11.1 mmol/L     Narrative:       GFR Normal >60  Chronic Kidney Disease <60  Kidney Failure <15    POC Glucose Fingerstick [[id number]]  (Abnormal) Collected:  08/21/17 0813    Specimen:  Blood Updated:  08/21/17 0814     Glucose 146 (H) mg/dL     Narrative:       Meter: [id number] : [id number] Jaziel PHILLIPS    POC Glucose Fingerstick [[id number]]  (Normal) Collected:  08/21/17 1128    Specimen:  Blood Updated:  08/21/17 1129     Glucose 88 mg/dL     Narrative:       Meter: [id number] : [id number] Mary Beth Strong RN    POC Glucose Fingerstick [[id number]]  (Abnormal) Collected:  08/21/17 1636    Specimen:  Blood Updated:  08/21/17 1638     Glucose 319 (H) mg/dL     Narrative:       Meter: [id number] : [id number] Jaziel Francoa S    POC Glucose Fingerstick [[id number]]  (Abnormal) Collected:  08/21/17 1734    Specimen:  Blood Updated:  08/21/17 1736     Glucose 280 (H) mg/dL     Narrative:       Meter: [id number] : [id number] Mary Beth Strong RN    POC Glucose Fingerstick [[id number]]  (Normal) Collected:  08/21/17 2115    Specimen:  Blood Updated:  08/21/17 2116     Glucose 112 mg/dL     Narrative:        Meter: [id number] : [id number] Arnoldo Jimenez    Basic Metabolic Panel [[id number]]  (Abnormal) Collected:  08/22/17 0504    Specimen:  Blood Updated:  08/22/17 0601     Glucose 163 (H) mg/dL      BUN 17 mg/dL      Creatinine 0.76 mg/dL      Sodium 139 mmol/L      Potassium 4.0 mmol/L      Chloride 100 mmol/L      CO2 27.9 mmol/L      Calcium 8.7 mg/dL      eGFR Non African Amer 117 mL/min/1.73      BUN/Creatinine Ratio 22.4     Anion Gap 11.1 mmol/L     Narrative:       GFR Normal >60  Chronic Kidney Disease <60  Kidney Failure <15    Magnesium [[id number]]  (Normal) Collected:  08/22/17 0504    Specimen:  Blood Updated:  08/22/17 0601     Magnesium 1.9 mg/dL     POC Glucose Fingerstick [[id number]]  (Abnormal) Collected:  08/22/17 0739    Specimen:  Blood Updated:  08/22/17 0740     Glucose 245 (H) mg/dL     Narrative:       Meter: [id number] : [id number] Pedro Pablo Mohan          Assessment/Plan     Patient Active Problem List   Diagnosis   • DKA (diabetic ketoacidoses)     #1 uncontrolled type 1 diabetes mellitus with hemoglobin A1c greater than 9.8%  #2 diabetic ketoacidosis appears to have resolved  #3 uncontrolled type 1 diabetes mellitus with neuropathy  #4 uncontrolled type 1 diabetes mellitus with retinopathy 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  #5 schizophrenia on medication   #6 hypoglycemia     Patient's blood sugars have stabilized over the past 2 days  Patient is noncompliant with the diet and insulin at home  He gives schizophrenia and lack of focus is the reason for noncompliance    Patient is currently receiving NovoLog 4 units 3 times a day along with a sliding scale and the Levemir 20 units in the morning  These are much smaller doses than at home but patient remained stable on this regimen for now  I recommend that patient continue this regimen at home and if his blood sugars are elevated and he may gradually return to home regimen     Patient will follow-up with his primary care physician  "      Matthew Barlow MD FACE.  08/22/17  9:20 AM      EMR Dragon / transcription disclaimer:     \"Dictated utilizing Dragon dictation\".      Electronically signed by Matthew HAIRSTON MD at 8/22/2017  9:25 AM          "
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
## 2017-08-23 NOTE — PROGRESS NOTES
Continued Stay Note  [facility]     Patient Name: Octavio Pham  MRN: [id number]  Today's Date: 8/23/2017    Admit Date: 8/15/2017          Discharge Plan       08/23/17 0836    Case Management/Social Work Plan    Additional Comments Patient was dc'd last evening.  Received a call from nurse.  Was able to assist her over the phone in obtaining a cab voucher and she also provided him with several T[employer] tokens.  I was able to look up the [employer] formulary on-line.  Fioricet and Seroquel were covered by [employer].  [employer] has a $2.00/co-pay.  Levemir was not covered, but she was able to provide him with a flexpen that would provide coverage until he get to [facility] today.      Final Note    Final Note DC'd to [facility] via Yellow cab voucher provided by hospital.               Discharge Codes       08/23/17 0840    Discharge Codes    Discharge Codes 01  Discharge to home        Expected Discharge Date and Time     Expected Discharge Date Expected Discharge Time    Aug 22, 2017             Elvira Goodman RN
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
## 2017-08-25 ENCOUNTER — HOSPITAL ENCOUNTER (INPATIENT)
Facility: HOSPITAL | Age: 37
LOS: 1 days | Discharge: HOME | End: 2017-08-26
Attending: EMERGENCY MEDICINE | Admitting: HOSPITALIST

## 2017-08-25 DIAGNOSIS — E10.10 DIABETIC KETOACIDOSIS WITHOUT COMA ASSOCIATED WITH TYPE 1 DIABETES MELLITUS (HCC): Primary | ICD-10-CM

## 2017-08-25 PROBLEM — F20.9 SCHIZOPHRENIA (HCC): Status: ACTIVE | Noted: 2017-08-25

## 2017-08-25 PROBLEM — F19.10 SUBSTANCE ABUSE (HCC): Status: ACTIVE | Noted: 2017-08-25

## 2017-08-25 LAB
ALBUMIN SERPL-MCNC: 4.1 G/DL (ref 3.5–5.2)
ALBUMIN/GLOB SERPL: 1.6 G/DL
ALP SERPL-CCNC: 86 U/L (ref 39–117)
ALT SERPL W P-5'-P-CCNC: 15 U/L (ref 1–41)
AMPHET+METHAMPHET UR QL: NEGATIVE
ANION GAP SERPL CALCULATED.3IONS-SCNC: 11.8 MMOL/L
ANION GAP SERPL CALCULATED.3IONS-SCNC: 12.4 MMOL/L
ANION GAP SERPL CALCULATED.3IONS-SCNC: 15.7 MMOL/L
ARTERIAL PATENCY WRIST A: POSITIVE
AST SERPL-CCNC: 14 U/L (ref 1–40)
ATMOSPHERIC PRESS: 754.4 MMHG
B-OH-BUTYR SERPL-SCNC: 1.75 MMOL/L (ref 0.02–0.27)
BARBITURATES UR QL SCN: NEGATIVE
BASE EXCESS BLDA CALC-SCNC: -2.6 MMOL/L (ref 0–2)
BASOPHILS # BLD AUTO: 0.06 10*3/MM3 (ref 0–0.2)
BASOPHILS NFR BLD AUTO: 0.8 % (ref 0–1.5)
BDY SITE: ABNORMAL
BENZODIAZ UR QL SCN: NEGATIVE
BILIRUB SERPL-MCNC: 0.3 MG/DL (ref 0.1–1.2)
BILIRUB UR QL STRIP: NEGATIVE
BUN BLD-MCNC: 10 MG/DL (ref 6–20)
BUN BLD-MCNC: 12 MG/DL (ref 6–20)
BUN BLD-MCNC: 17 MG/DL (ref 6–20)
BUN/CREAT SERPL: 16.7 (ref 7–25)
BUN/CREAT SERPL: 19.7 (ref 7–25)
BUN/CREAT SERPL: 20.2 (ref 7–25)
CALCIUM SPEC-SCNC: 8.4 MG/DL (ref 8.6–10.5)
CALCIUM SPEC-SCNC: 8.5 MG/DL (ref 8.6–10.5)
CALCIUM SPEC-SCNC: 8.5 MG/DL (ref 8.6–10.5)
CANNABINOIDS SERPL QL: POSITIVE
CHLORIDE SERPL-SCNC: 105 MMOL/L (ref 98–107)
CHLORIDE SERPL-SCNC: 106 MMOL/L (ref 98–107)
CHLORIDE SERPL-SCNC: 96 MMOL/L (ref 98–107)
CLARITY UR: CLEAR
CO2 SERPL-SCNC: 22.3 MMOL/L (ref 22–29)
CO2 SERPL-SCNC: 24.2 MMOL/L (ref 22–29)
CO2 SERPL-SCNC: 25.6 MMOL/L (ref 22–29)
COCAINE UR QL: NEGATIVE
COLOR UR: YELLOW
CREAT BLD-MCNC: 0.6 MG/DL (ref 0.76–1.27)
CREAT BLD-MCNC: 0.61 MG/DL (ref 0.76–1.27)
CREAT BLD-MCNC: 0.84 MG/DL (ref 0.76–1.27)
D-LACTATE SERPL-SCNC: 1 MMOL/L (ref 0.5–2)
DEPRECATED RDW RBC AUTO: 47.3 FL (ref 37–54)
EOSINOPHIL # BLD AUTO: 0.13 10*3/MM3 (ref 0–0.7)
EOSINOPHIL NFR BLD AUTO: 1.7 % (ref 0.3–6.2)
ERYTHROCYTE [DISTWIDTH] IN BLOOD BY AUTOMATED COUNT: 13.7 % (ref 11.5–14.5)
GFR SERPL CREATININE-BSD FRML MDRD: 104 ML/MIN/1.73
GFR SERPL CREATININE-BSD FRML MDRD: 150 ML/MIN/1.73
GFR SERPL CREATININE-BSD FRML MDRD: >150 ML/MIN/1.73
GLOBULIN UR ELPH-MCNC: 2.5 GM/DL
GLUCOSE BLD-MCNC: 192 MG/DL (ref 65–99)
GLUCOSE BLD-MCNC: 261 MG/DL (ref 65–99)
GLUCOSE BLD-MCNC: 691 MG/DL (ref 65–99)
GLUCOSE BLD-MCNC: 93 MG/DL (ref 65–99)
GLUCOSE BLDC GLUCOMTR-MCNC: 198 MG/DL (ref 70–130)
GLUCOSE BLDC GLUCOMTR-MCNC: 207 MG/DL (ref 70–130)
GLUCOSE BLDC GLUCOMTR-MCNC: 268 MG/DL (ref 70–130)
GLUCOSE BLDC GLUCOMTR-MCNC: 285 MG/DL (ref 70–130)
GLUCOSE BLDC GLUCOMTR-MCNC: 413 MG/DL (ref 70–130)
GLUCOSE BLDC GLUCOMTR-MCNC: 44 MG/DL (ref 70–130)
GLUCOSE BLDC GLUCOMTR-MCNC: 562 MG/DL (ref 70–130)
GLUCOSE BLDC GLUCOMTR-MCNC: 80 MG/DL (ref 70–130)
GLUCOSE BLDC GLUCOMTR-MCNC: >599 MG/DL (ref 70–130)
GLUCOSE UR STRIP-MCNC: ABNORMAL MG/DL
HBA1C MFR BLD: 9.4 % (ref 4.8–5.6)
HCO3 BLDA-SCNC: 23 MMOL/L (ref 22–28)
HCT VFR BLD AUTO: 36 % (ref 40.4–52.2)
HGB BLD-MCNC: 11.4 G/DL (ref 13.7–17.6)
HGB UR QL STRIP.AUTO: NEGATIVE
IMM GRANULOCYTES # BLD: 0 10*3/MM3 (ref 0–0.03)
IMM GRANULOCYTES NFR BLD: 0 % (ref 0–0.5)
INR PPP: 0.98 (ref 0.9–1.1)
KETONES UR QL STRIP: ABNORMAL
LEUKOCYTE ESTERASE UR QL STRIP.AUTO: NEGATIVE
LYMPHOCYTES # BLD AUTO: 2.32 10*3/MM3 (ref 0.9–4.8)
LYMPHOCYTES NFR BLD AUTO: 29.8 % (ref 19.6–45.3)
MAGNESIUM SERPL-MCNC: 2 MG/DL (ref 1.6–2.6)
MCH RBC QN AUTO: 29.8 PG (ref 27–32.7)
MCHC RBC AUTO-ENTMCNC: 31.7 G/DL (ref 32.6–36.4)
MCV RBC AUTO: 94.2 FL (ref 79.8–96.2)
METHADONE UR QL SCN: NEGATIVE
MODALITY: ABNORMAL
MONOCYTES # BLD AUTO: 0.86 10*3/MM3 (ref 0.2–1.2)
MONOCYTES NFR BLD AUTO: 11.1 % (ref 5–12)
NEUTROPHILS # BLD AUTO: 4.41 10*3/MM3 (ref 1.9–8.1)
NEUTROPHILS NFR BLD AUTO: 56.6 % (ref 42.7–76)
NITRITE UR QL STRIP: NEGATIVE
NRBC BLD MANUAL-RTO: 0 /100 WBC (ref 0–0)
OPIATES UR QL: NEGATIVE
OXYCODONE UR QL SCN: NEGATIVE
PCO2 BLDA: 41.7 MM HG (ref 35–45)
PH BLDA: 7.35 PH UNITS (ref 7.35–7.45)
PH UR STRIP.AUTO: 5.5 [PH] (ref 5–8)
PHOSPHATE SERPL-MCNC: 3.8 MG/DL (ref 2.5–4.5)
PLATELET # BLD AUTO: 317 10*3/MM3 (ref 140–500)
PMV BLD AUTO: 10.7 FL (ref 6–12)
PO2 BLDA: 85.1 MM HG (ref 80–100)
POTASSIUM BLD-SCNC: 3.6 MMOL/L (ref 3.5–5.2)
POTASSIUM BLD-SCNC: 3.7 MMOL/L (ref 3.5–5.2)
POTASSIUM BLD-SCNC: 4.5 MMOL/L (ref 3.5–5.2)
PROT SERPL-MCNC: 6.6 G/DL (ref 6–8.5)
PROT UR QL STRIP: NEGATIVE
PROTHROMBIN TIME: 12.6 SECONDS (ref 11.7–14.2)
RBC # BLD AUTO: 3.82 10*6/MM3 (ref 4.6–6)
SAO2 % BLDCOA: 95.9 % (ref 92–99)
SODIUM BLD-SCNC: 134 MMOL/L (ref 136–145)
SODIUM BLD-SCNC: 141 MMOL/L (ref 136–145)
SODIUM BLD-SCNC: 144 MMOL/L (ref 136–145)
SP GR UR STRIP: >=1.03 (ref 1–1.03)
TOTAL RATE: 20 BREATHS/MINUTE
UROBILINOGEN UR QL STRIP: ABNORMAL
WBC NRBC COR # BLD: 7.78 10*3/MM3 (ref 4.5–10.7)

## 2017-08-25 PROCEDURE — 85610 PROTHROMBIN TIME: CPT | Performed by: EMERGENCY MEDICINE

## 2017-08-25 PROCEDURE — 84100 ASSAY OF PHOSPHORUS: CPT | Performed by: EMERGENCY MEDICINE

## 2017-08-25 PROCEDURE — 82010 KETONE BODYS QUAN: CPT | Performed by: EMERGENCY MEDICINE

## 2017-08-25 PROCEDURE — 85025 COMPLETE CBC W/AUTO DIFF WBC: CPT | Performed by: EMERGENCY MEDICINE

## 2017-08-25 PROCEDURE — 80307 DRUG TEST PRSMV CHEM ANLYZR: CPT | Performed by: HOSPITALIST

## 2017-08-25 PROCEDURE — 83036 HEMOGLOBIN GLYCOSYLATED A1C: CPT | Performed by: EMERGENCY MEDICINE

## 2017-08-25 PROCEDURE — 81003 URINALYSIS AUTO W/O SCOPE: CPT | Performed by: EMERGENCY MEDICINE

## 2017-08-25 PROCEDURE — 93010 ELECTROCARDIOGRAM REPORT: CPT | Performed by: INTERNAL MEDICINE

## 2017-08-25 PROCEDURE — 83735 ASSAY OF MAGNESIUM: CPT | Performed by: EMERGENCY MEDICINE

## 2017-08-25 PROCEDURE — 83605 ASSAY OF LACTIC ACID: CPT | Performed by: EMERGENCY MEDICINE

## 2017-08-25 PROCEDURE — 63710000001 INSULIN ASPART PER 5 UNITS: Performed by: HOSPITALIST

## 2017-08-25 PROCEDURE — 82962 GLUCOSE BLOOD TEST: CPT

## 2017-08-25 PROCEDURE — 36600 WITHDRAWAL OF ARTERIAL BLOOD: CPT

## 2017-08-25 PROCEDURE — 99285 EMERGENCY DEPT VISIT HI MDM: CPT

## 2017-08-25 PROCEDURE — 93005 ELECTROCARDIOGRAM TRACING: CPT | Performed by: EMERGENCY MEDICINE

## 2017-08-25 PROCEDURE — 80053 COMPREHEN METABOLIC PANEL: CPT | Performed by: EMERGENCY MEDICINE

## 2017-08-25 PROCEDURE — 63710000001 INSULIN DETEMER PER 5 UNITS: Performed by: HOSPITALIST

## 2017-08-25 PROCEDURE — 63710000001 INSULIN ISOPHANE HUMAN PER 5 UNITS: Performed by: HOSPITALIST

## 2017-08-25 PROCEDURE — 82803 BLOOD GASES ANY COMBINATION: CPT

## 2017-08-25 PROCEDURE — 82947 ASSAY GLUCOSE BLOOD QUANT: CPT | Performed by: HOSPITALIST

## 2017-08-25 PROCEDURE — 63710000001 INSULIN REGULAR HUMAN PER 5 UNITS: Performed by: EMERGENCY MEDICINE

## 2017-08-25 RX ORDER — POTASSIUM CHLORIDE 1.5 G/1.77G
40 POWDER, FOR SOLUTION ORAL AS NEEDED
Status: DISCONTINUED | OUTPATIENT
Start: 2017-08-25 | End: 2017-08-25

## 2017-08-25 RX ORDER — POTASSIUM CHLORIDE 7.46 G/1000ML
10 INJECTION, SOLUTION INTRAVENOUS
Status: DISCONTINUED | OUTPATIENT
Start: 2017-08-25 | End: 2017-08-25

## 2017-08-25 RX ORDER — POTASSIUM CHLORIDE 750 MG/1
20 CAPSULE, EXTENDED RELEASE ORAL AS NEEDED
Status: DISCONTINUED | OUTPATIENT
Start: 2017-08-25 | End: 2017-08-25

## 2017-08-25 RX ORDER — POTASSIUM CHLORIDE 1.5 G/1.77G
10 POWDER, FOR SOLUTION ORAL AS NEEDED
Status: DISCONTINUED | OUTPATIENT
Start: 2017-08-25 | End: 2017-08-25

## 2017-08-25 RX ORDER — POTASSIUM CHLORIDE 750 MG/1
10 CAPSULE, EXTENDED RELEASE ORAL AS NEEDED
Status: DISCONTINUED | OUTPATIENT
Start: 2017-08-25 | End: 2017-08-25

## 2017-08-25 RX ORDER — NICOTINE POLACRILEX 4 MG
15 LOZENGE BUCCAL
Status: DISCONTINUED | OUTPATIENT
Start: 2017-08-25 | End: 2017-08-26 | Stop reason: HOSPADM

## 2017-08-25 RX ORDER — QUETIAPINE FUMARATE 100 MG/1
300 TABLET, FILM COATED ORAL ONCE
Status: COMPLETED | OUTPATIENT
Start: 2017-08-25 | End: 2017-08-25

## 2017-08-25 RX ORDER — POTASSIUM CHLORIDE 1.5 G/1.77G
20 POWDER, FOR SOLUTION ORAL AS NEEDED
Status: DISCONTINUED | OUTPATIENT
Start: 2017-08-25 | End: 2017-08-25

## 2017-08-25 RX ORDER — DEXTROSE MONOHYDRATE 25 G/50ML
12.5 INJECTION, SOLUTION INTRAVENOUS
Status: DISCONTINUED | OUTPATIENT
Start: 2017-08-25 | End: 2017-08-26 | Stop reason: HOSPADM

## 2017-08-25 RX ORDER — SODIUM CHLORIDE 9 MG/ML
100 INJECTION, SOLUTION INTRAVENOUS CONTINUOUS
Status: DISCONTINUED | OUTPATIENT
Start: 2017-08-25 | End: 2017-08-26

## 2017-08-25 RX ORDER — QUETIAPINE FUMARATE 100 MG/1
100 TABLET, FILM COATED ORAL EVERY 12 HOURS SCHEDULED
Status: DISCONTINUED | OUTPATIENT
Start: 2017-08-25 | End: 2017-08-26

## 2017-08-25 RX ORDER — ACETAMINOPHEN 325 MG/1
650 TABLET ORAL EVERY 4 HOURS PRN
Status: DISCONTINUED | OUTPATIENT
Start: 2017-08-25 | End: 2017-08-26 | Stop reason: HOSPADM

## 2017-08-25 RX ORDER — PAROXETINE HYDROCHLORIDE 20 MG/1
20 TABLET, FILM COATED ORAL ONCE
Status: COMPLETED | OUTPATIENT
Start: 2017-08-25 | End: 2017-08-25

## 2017-08-25 RX ORDER — DEXTROSE, SODIUM CHLORIDE, AND POTASSIUM CHLORIDE 5; .45; .15 G/100ML; G/100ML; G/100ML
150 INJECTION INTRAVENOUS CONTINUOUS PRN
Status: DISCONTINUED | OUTPATIENT
Start: 2017-08-25 | End: 2017-08-26

## 2017-08-25 RX ORDER — SODIUM CHLORIDE AND POTASSIUM CHLORIDE 150; 450 MG/100ML; MG/100ML
250 INJECTION, SOLUTION INTRAVENOUS CONTINUOUS PRN
Status: DISCONTINUED | OUTPATIENT
Start: 2017-08-25 | End: 2017-08-25

## 2017-08-25 RX ORDER — POTASSIUM CHLORIDE 750 MG/1
40 CAPSULE, EXTENDED RELEASE ORAL AS NEEDED
Status: DISCONTINUED | OUTPATIENT
Start: 2017-08-25 | End: 2017-08-25

## 2017-08-25 RX ORDER — DEXTROSE AND SODIUM CHLORIDE 5; .45 G/100ML; G/100ML
150 INJECTION, SOLUTION INTRAVENOUS CONTINUOUS PRN
Status: DISCONTINUED | OUTPATIENT
Start: 2017-08-25 | End: 2017-08-26

## 2017-08-25 RX ORDER — DEXTROSE MONOHYDRATE 25 G/50ML
25 INJECTION, SOLUTION INTRAVENOUS
Status: DISCONTINUED | OUTPATIENT
Start: 2017-08-25 | End: 2017-08-26 | Stop reason: HOSPADM

## 2017-08-25 RX ORDER — SODIUM CHLORIDE 450 MG/100ML
250 INJECTION, SOLUTION INTRAVENOUS CONTINUOUS
Status: DISCONTINUED | OUTPATIENT
Start: 2017-08-25 | End: 2017-08-25

## 2017-08-25 RX ORDER — SODIUM CHLORIDE 0.9 % (FLUSH) 0.9 %
1-10 SYRINGE (ML) INJECTION AS NEEDED
Status: DISCONTINUED | OUTPATIENT
Start: 2017-08-25 | End: 2017-08-26 | Stop reason: HOSPADM

## 2017-08-25 RX ADMIN — INSULIN DETEMIR 20 UNITS: 100 INJECTION, SOLUTION SUBCUTANEOUS at 21:20

## 2017-08-25 RX ADMIN — HUMAN INSULIN 15 UNITS: 100 INJECTION, SUSPENSION SUBCUTANEOUS at 10:57

## 2017-08-25 RX ADMIN — HUMAN INSULIN 5.2 UNITS: 100 INJECTION, SOLUTION SUBCUTANEOUS at 06:59

## 2017-08-25 RX ADMIN — SODIUM CHLORIDE 0.1 UNITS/KG/HR: 9 INJECTION, SOLUTION INTRAVENOUS at 07:00

## 2017-08-25 RX ADMIN — INSULIN ASPART 5 UNITS: 100 INJECTION, SOLUTION INTRAVENOUS; SUBCUTANEOUS at 12:12

## 2017-08-25 RX ADMIN — SODIUM CHLORIDE 1000 ML: 9 INJECTION, SOLUTION INTRAVENOUS at 05:28

## 2017-08-25 RX ADMIN — QUETIAPINE FUMARATE 100 MG: 100 TABLET, FILM COATED ORAL at 12:19

## 2017-08-25 RX ADMIN — SODIUM CHLORIDE 250 ML/HR: 4.5 INJECTION, SOLUTION INTRAVENOUS at 07:00

## 2017-08-25 RX ADMIN — QUETIAPINE FUMARATE 300 MG: 100 TABLET, FILM COATED ORAL at 05:55

## 2017-08-25 RX ADMIN — ACETAMINOPHEN 650 MG: 325 TABLET ORAL at 12:18

## 2017-08-25 RX ADMIN — PAROXETINE HYDROCHLORIDE HEMIHYDRATE 20 MG: 20 TABLET, FILM COATED ORAL at 05:55

## 2017-08-25 RX ADMIN — SODIUM CHLORIDE 100 ML/HR: 9 INJECTION, SOLUTION INTRAVENOUS at 20:14

## 2017-08-25 RX ADMIN — SODIUM CHLORIDE 100 ML/HR: 9 INJECTION, SOLUTION INTRAVENOUS at 10:58

## 2017-08-25 RX ADMIN — INSULIN ASPART 3 UNITS: 100 INJECTION, SOLUTION INTRAVENOUS; SUBCUTANEOUS at 13:21

## 2017-08-25 RX ADMIN — INSULIN ASPART 4 UNITS: 100 INJECTION, SOLUTION INTRAVENOUS; SUBCUTANEOUS at 21:21

## 2017-08-26 VITALS
OXYGEN SATURATION: 97 % | SYSTOLIC BLOOD PRESSURE: 120 MMHG | DIASTOLIC BLOOD PRESSURE: 90 MMHG | WEIGHT: 115 LBS | TEMPERATURE: 98.6 F | BODY MASS INDEX: 20.38 KG/M2 | HEIGHT: 63 IN | HEART RATE: 91 BPM | RESPIRATION RATE: 16 BRPM

## 2017-08-26 PROBLEM — F60.2 ANTISOCIAL PERSONALITY DISORDER (HCC): Status: ACTIVE | Noted: 2017-08-26

## 2017-08-26 PROBLEM — Z91.199 NONCOMPLIANCE: Status: ACTIVE | Noted: 2017-08-26

## 2017-08-26 LAB
ANION GAP SERPL CALCULATED.3IONS-SCNC: 13.3 MMOL/L
BUN BLD-MCNC: 7 MG/DL (ref 6–20)
BUN/CREAT SERPL: 13.2 (ref 7–25)
CALCIUM SPEC-SCNC: 8.4 MG/DL (ref 8.6–10.5)
CHLORIDE SERPL-SCNC: 108 MMOL/L (ref 98–107)
CK SERPL-CCNC: 65 U/L (ref 20–200)
CO2 SERPL-SCNC: 25.7 MMOL/L (ref 22–29)
CREAT BLD-MCNC: 0.53 MG/DL (ref 0.76–1.27)
GFR SERPL CREATININE-BSD FRML MDRD: >150 ML/MIN/1.73
GLUCOSE BLD-MCNC: 41 MG/DL (ref 65–99)
GLUCOSE BLDC GLUCOMTR-MCNC: 103 MG/DL (ref 70–130)
GLUCOSE BLDC GLUCOMTR-MCNC: 128 MG/DL (ref 70–130)
GLUCOSE BLDC GLUCOMTR-MCNC: 42 MG/DL (ref 70–130)
GLUCOSE BLDC GLUCOMTR-MCNC: 50 MG/DL (ref 70–130)
GLUCOSE BLDC GLUCOMTR-MCNC: 58 MG/DL (ref 70–130)
GLUCOSE BLDC GLUCOMTR-MCNC: 80 MG/DL (ref 70–130)
GLUCOSE BLDC GLUCOMTR-MCNC: 97 MG/DL (ref 70–130)
POTASSIUM BLD-SCNC: 3.1 MMOL/L (ref 3.5–5.2)
SODIUM BLD-SCNC: 147 MMOL/L (ref 136–145)
TSH SERPL DL<=0.05 MIU/L-ACNC: 0.96 MIU/ML (ref 0.27–4.2)

## 2017-08-26 PROCEDURE — 84443 ASSAY THYROID STIM HORMONE: CPT | Performed by: HOSPITALIST

## 2017-08-26 PROCEDURE — 82962 GLUCOSE BLOOD TEST: CPT

## 2017-08-26 PROCEDURE — 63710000001 INSULIN ASPART PER 5 UNITS: Performed by: HOSPITALIST

## 2017-08-26 PROCEDURE — 82550 ASSAY OF CK (CPK): CPT | Performed by: HOSPITALIST

## 2017-08-26 PROCEDURE — 80048 BASIC METABOLIC PNL TOTAL CA: CPT | Performed by: HOSPITALIST

## 2017-08-26 RX ORDER — POTASSIUM CHLORIDE 750 MG/1
40 CAPSULE, EXTENDED RELEASE ORAL ONCE
Status: COMPLETED | OUTPATIENT
Start: 2017-08-26 | End: 2017-08-26

## 2017-08-26 RX ORDER — DIVALPROEX SODIUM 250 MG/1
250 TABLET, DELAYED RELEASE ORAL EVERY 12 HOURS SCHEDULED
Qty: 60 TABLET | Refills: 0 | Status: ON HOLD | OUTPATIENT
Start: 2017-08-26 | End: 2017-09-27 | Stop reason: SDUPTHER

## 2017-08-26 RX ORDER — POTASSIUM CHLORIDE 1.5 G/1.77G
40 POWDER, FOR SOLUTION ORAL ONCE
Status: DISCONTINUED | OUTPATIENT
Start: 2017-08-26 | End: 2017-08-26 | Stop reason: HOSPADM

## 2017-08-26 RX ORDER — POTASSIUM CHLORIDE 750 MG/1
20 CAPSULE, EXTENDED RELEASE ORAL ONCE
Status: DISCONTINUED | OUTPATIENT
Start: 2017-08-26 | End: 2017-08-26 | Stop reason: HOSPADM

## 2017-08-26 RX ORDER — POTASSIUM CHLORIDE 1.5 G/1.77G
20 POWDER, FOR SOLUTION ORAL ONCE
Status: DISCONTINUED | OUTPATIENT
Start: 2017-08-26 | End: 2017-08-26 | Stop reason: HOSPADM

## 2017-08-26 RX ORDER — DIVALPROEX SODIUM 250 MG/1
250 TABLET, DELAYED RELEASE ORAL EVERY 12 HOURS SCHEDULED
Status: DISCONTINUED | OUTPATIENT
Start: 2017-08-26 | End: 2017-08-26 | Stop reason: HOSPADM

## 2017-08-26 RX ADMIN — POTASSIUM CHLORIDE 40 MEQ: 750 CAPSULE, EXTENDED RELEASE ORAL at 12:02

## 2017-08-26 RX ADMIN — INSULIN ASPART 5 UNITS: 100 INJECTION, SOLUTION INTRAVENOUS; SUBCUTANEOUS at 08:14

## 2017-08-26 RX ADMIN — SODIUM CHLORIDE 100 ML/HR: 9 INJECTION, SOLUTION INTRAVENOUS at 06:07

## 2017-08-26 RX ADMIN — INSULIN ASPART 4 UNITS: 100 INJECTION, SOLUTION INTRAVENOUS; SUBCUTANEOUS at 11:37

## 2017-08-26 RX ADMIN — DEXTROSE MONOHYDRATE 12.5 G: 25 INJECTION, SOLUTION INTRAVENOUS at 01:57

## 2017-08-26 RX ADMIN — QUETIAPINE FUMARATE 100 MG: 100 TABLET, FILM COATED ORAL at 08:14

## 2017-09-24 ENCOUNTER — APPOINTMENT (OUTPATIENT)
Dept: GENERAL RADIOLOGY | Facility: HOSPITAL | Age: 37
End: 2017-09-24

## 2017-09-24 ENCOUNTER — HOSPITAL ENCOUNTER (INPATIENT)
Facility: HOSPITAL | Age: 37
LOS: 3 days | Discharge: HOME | End: 2017-09-27
Attending: EMERGENCY MEDICINE | Admitting: INTERNAL MEDICINE

## 2017-09-24 ENCOUNTER — APPOINTMENT (OUTPATIENT)
Dept: CT IMAGING | Facility: HOSPITAL | Age: 37
End: 2017-09-24

## 2017-09-24 DIAGNOSIS — Z91.199 H/O NONCOMPLIANCE WITH MEDICAL TREATMENT, PRESENTING HAZARDS TO HEALTH: ICD-10-CM

## 2017-09-24 DIAGNOSIS — E87.5 HYPERKALEMIA: ICD-10-CM

## 2017-09-24 DIAGNOSIS — E10.10 DIABETIC KETOACIDOSIS WITHOUT COMA ASSOCIATED WITH TYPE 1 DIABETES MELLITUS (HCC): ICD-10-CM

## 2017-09-24 DIAGNOSIS — N17.9 ACUTE RENAL FAILURE, UNSPECIFIED ACUTE RENAL FAILURE TYPE (HCC): Primary | ICD-10-CM

## 2017-09-24 LAB
ALBUMIN SERPL-MCNC: 4.7 G/DL (ref 3.5–5.2)
ALBUMIN/GLOB SERPL: 1.7 G/DL
ALP SERPL-CCNC: 117 U/L (ref 39–117)
ALT SERPL W P-5'-P-CCNC: 22 U/L (ref 1–41)
ANION GAP SERPL CALCULATED.3IONS-SCNC: 40.3 MMOL/L
ARTERIAL PATENCY WRIST A: POSITIVE
AST SERPL-CCNC: 27 U/L (ref 1–40)
ATMOSPHERIC PRESS: 749.8 MMHG
B-OH-BUTYR SERPL-SCNC: 10.58 MMOL/L (ref 0.02–0.27)
BACTERIA UR QL AUTO: NORMAL /HPF
BASE EXCESS BLDA CALC-SCNC: -22.3 MMOL/L (ref 0–2)
BASOPHILS # BLD AUTO: 0.08 10*3/MM3 (ref 0–0.2)
BASOPHILS NFR BLD AUTO: 0.2 % (ref 0–1.5)
BDY SITE: ABNORMAL
BILIRUB SERPL-MCNC: 0.5 MG/DL (ref 0.1–1.2)
BILIRUB UR QL STRIP: NEGATIVE
BUN BLD-MCNC: 31 MG/DL (ref 6–20)
BUN/CREAT SERPL: 18.1 (ref 7–25)
CALCIUM SPEC-SCNC: 9.2 MG/DL (ref 8.6–10.5)
CHLORIDE SERPL-SCNC: 92 MMOL/L (ref 98–107)
CK SERPL-CCNC: 56 U/L (ref 20–200)
CLARITY UR: CLEAR
CO2 SERPL-SCNC: 5.7 MMOL/L (ref 22–29)
COLOR UR: YELLOW
CREAT BLD-MCNC: 1.71 MG/DL (ref 0.76–1.27)
D-LACTATE SERPL-SCNC: 4.8 MMOL/L (ref 0.5–2)
DEPRECATED RDW RBC AUTO: 53.6 FL (ref 37–54)
EOSINOPHIL # BLD AUTO: 0 10*3/MM3 (ref 0–0.7)
EOSINOPHIL NFR BLD AUTO: 0 % (ref 0.3–6.2)
ERYTHROCYTE [DISTWIDTH] IN BLOOD BY AUTOMATED COUNT: 14.7 % (ref 11.5–14.5)
GFR SERPL CREATININE-BSD FRML MDRD: 46 ML/MIN/1.73
GLOBULIN UR ELPH-MCNC: 2.7 GM/DL
GLUCOSE BLD-MCNC: 804 MG/DL (ref 65–99)
GLUCOSE BLDC GLUCOMTR-MCNC: >599 MG/DL (ref 70–130)
GLUCOSE BLDC GLUCOMTR-MCNC: >599 MG/DL (ref 70–130)
GLUCOSE UR STRIP-MCNC: ABNORMAL MG/DL
HCO3 BLDA-SCNC: 5.3 MMOL/L (ref 22–28)
HCT VFR BLD AUTO: 47.2 % (ref 40.4–52.2)
HGB BLD-MCNC: 14.2 G/DL (ref 13.7–17.6)
HGB UR QL STRIP.AUTO: ABNORMAL
HOLD SPECIMEN: NORMAL
HOLD SPECIMEN: NORMAL
HYALINE CASTS UR QL AUTO: NORMAL /LPF
HYPOCHROMIA BLD QL: NORMAL
IMM GRANULOCYTES # BLD: 0.66 10*3/MM3 (ref 0–0.03)
IMM GRANULOCYTES NFR BLD: 1.8 % (ref 0–0.5)
INR PPP: 1.12 (ref 0.9–1.1)
KETONES UR QL STRIP: ABNORMAL
LEUKOCYTE ESTERASE UR QL STRIP.AUTO: NEGATIVE
LYMPHOCYTES # BLD AUTO: 2.03 10*3/MM3 (ref 0.9–4.8)
LYMPHOCYTES NFR BLD AUTO: 5.6 % (ref 19.6–45.3)
MAGNESIUM SERPL-MCNC: 2.5 MG/DL (ref 1.6–2.6)
MCH RBC QN AUTO: 29.7 PG (ref 27–32.7)
MCHC RBC AUTO-ENTMCNC: 30.1 G/DL (ref 32.6–36.4)
MCV RBC AUTO: 98.7 FL (ref 79.8–96.2)
MODALITY: ABNORMAL
MONOCYTES # BLD AUTO: 1.96 10*3/MM3 (ref 0.2–1.2)
MONOCYTES NFR BLD AUTO: 5.4 % (ref 5–12)
NEUTROPHILS # BLD AUTO: 31.57 10*3/MM3 (ref 1.9–8.1)
NEUTROPHILS NFR BLD AUTO: 87 % (ref 42.7–76)
NEUTS VAC BLD QL SMEAR: NORMAL
NITRITE UR QL STRIP: NEGATIVE
NRBC BLD MANUAL-RTO: 0 /100 WBC (ref 0–0)
PCO2 BLDA: 17 MM HG (ref 35–45)
PH BLDA: 7.1 PH UNITS (ref 7.35–7.45)
PH UR STRIP.AUTO: <=5 [PH] (ref 5–8)
PLAT MORPH BLD: NORMAL
PLATELET # BLD AUTO: 341 10*3/MM3 (ref 140–500)
PMV BLD AUTO: 10.8 FL (ref 6–12)
PO2 BLDA: 134.9 MM HG (ref 80–100)
POTASSIUM BLD-SCNC: 6.6 MMOL/L (ref 3.5–5.2)
PROT SERPL-MCNC: 7.4 G/DL (ref 6–8.5)
PROT UR QL STRIP: ABNORMAL
PROTHROMBIN TIME: 14 SECONDS (ref 11.7–14.2)
RBC # BLD AUTO: 4.78 10*6/MM3 (ref 4.6–6)
RBC # UR: NORMAL /HPF
REF LAB TEST METHOD: NORMAL
SAO2 % BLDCOA: 97.9 % (ref 92–99)
SODIUM BLD-SCNC: 138 MMOL/L (ref 136–145)
SP GR UR STRIP: 1.03 (ref 1–1.03)
SQUAMOUS #/AREA URNS HPF: NORMAL /HPF
TOTAL RATE: 20 BREATHS/MINUTE
TROPONIN T SERPL-MCNC: <0.01 NG/ML (ref 0–0.03)
UROBILINOGEN UR QL STRIP: ABNORMAL
VALPROATE SERPL-MCNC: <2.8 MCG/ML (ref 50–125)
WBC NRBC COR # BLD: 36.3 10*3/MM3 (ref 4.5–10.7)
WBC UR QL AUTO: NORMAL /HPF
WHOLE BLOOD HOLD SPECIMEN: NORMAL
WHOLE BLOOD HOLD SPECIMEN: NORMAL

## 2017-09-24 PROCEDURE — 93010 ELECTROCARDIOGRAM REPORT: CPT | Performed by: INTERNAL MEDICINE

## 2017-09-24 PROCEDURE — 87040 BLOOD CULTURE FOR BACTERIA: CPT | Performed by: EMERGENCY MEDICINE

## 2017-09-24 PROCEDURE — 82550 ASSAY OF CK (CPK): CPT | Performed by: EMERGENCY MEDICINE

## 2017-09-24 PROCEDURE — 81001 URINALYSIS AUTO W/SCOPE: CPT | Performed by: EMERGENCY MEDICINE

## 2017-09-24 PROCEDURE — 85007 BL SMEAR W/DIFF WBC COUNT: CPT | Performed by: EMERGENCY MEDICINE

## 2017-09-24 PROCEDURE — 83605 ASSAY OF LACTIC ACID: CPT | Performed by: EMERGENCY MEDICINE

## 2017-09-24 PROCEDURE — 99285 EMERGENCY DEPT VISIT HI MDM: CPT

## 2017-09-24 PROCEDURE — 83735 ASSAY OF MAGNESIUM: CPT | Performed by: EMERGENCY MEDICINE

## 2017-09-24 PROCEDURE — 82962 GLUCOSE BLOOD TEST: CPT

## 2017-09-24 PROCEDURE — 80164 ASSAY DIPROPYLACETIC ACD TOT: CPT | Performed by: EMERGENCY MEDICINE

## 2017-09-24 PROCEDURE — 80053 COMPREHEN METABOLIC PANEL: CPT | Performed by: NURSE PRACTITIONER

## 2017-09-24 PROCEDURE — 93005 ELECTROCARDIOGRAM TRACING: CPT | Performed by: EMERGENCY MEDICINE

## 2017-09-24 PROCEDURE — 36600 WITHDRAWAL OF ARTERIAL BLOOD: CPT

## 2017-09-24 PROCEDURE — 83735 ASSAY OF MAGNESIUM: CPT | Performed by: NURSE PRACTITIONER

## 2017-09-24 PROCEDURE — 85610 PROTHROMBIN TIME: CPT | Performed by: EMERGENCY MEDICINE

## 2017-09-24 PROCEDURE — 82010 KETONE BODYS QUAN: CPT | Performed by: EMERGENCY MEDICINE

## 2017-09-24 PROCEDURE — 84484 ASSAY OF TROPONIN QUANT: CPT | Performed by: EMERGENCY MEDICINE

## 2017-09-24 PROCEDURE — 74176 CT ABD & PELVIS W/O CONTRAST: CPT

## 2017-09-24 PROCEDURE — 85025 COMPLETE CBC W/AUTO DIFF WBC: CPT | Performed by: EMERGENCY MEDICINE

## 2017-09-24 PROCEDURE — 63710000001 INSULIN REGULAR HUMAN PER 5 UNITS: Performed by: EMERGENCY MEDICINE

## 2017-09-24 PROCEDURE — 71010 HC CHEST PA OR AP: CPT

## 2017-09-24 PROCEDURE — 82803 BLOOD GASES ANY COMBINATION: CPT

## 2017-09-24 PROCEDURE — 25010000002 ONDANSETRON PER 1 MG: Performed by: EMERGENCY MEDICINE

## 2017-09-24 PROCEDURE — 80053 COMPREHEN METABOLIC PANEL: CPT | Performed by: EMERGENCY MEDICINE

## 2017-09-24 RX ORDER — ONDANSETRON 2 MG/ML
4 INJECTION INTRAMUSCULAR; INTRAVENOUS ONCE
Status: COMPLETED | OUTPATIENT
Start: 2017-09-24 | End: 2017-09-24

## 2017-09-24 RX ORDER — DEXTROSE AND SODIUM CHLORIDE 5; .45 G/100ML; G/100ML
150 INJECTION, SOLUTION INTRAVENOUS CONTINUOUS PRN
Status: DISCONTINUED | OUTPATIENT
Start: 2017-09-24 | End: 2017-09-25

## 2017-09-24 RX ORDER — POTASSIUM CHLORIDE 750 MG/1
40 CAPSULE, EXTENDED RELEASE ORAL AS NEEDED
Status: DISCONTINUED | OUTPATIENT
Start: 2017-09-24 | End: 2017-09-25

## 2017-09-24 RX ORDER — POTASSIUM CHLORIDE 7.46 G/1000ML
10 INJECTION, SOLUTION INTRAVENOUS
Status: DISCONTINUED | OUTPATIENT
Start: 2017-09-24 | End: 2017-09-25

## 2017-09-24 RX ORDER — POTASSIUM CHLORIDE 750 MG/1
20 CAPSULE, EXTENDED RELEASE ORAL AS NEEDED
Status: DISCONTINUED | OUTPATIENT
Start: 2017-09-24 | End: 2017-09-25

## 2017-09-24 RX ORDER — POTASSIUM CHLORIDE 1.5 G/1.77G
40 POWDER, FOR SOLUTION ORAL AS NEEDED
Status: DISCONTINUED | OUTPATIENT
Start: 2017-09-24 | End: 2017-09-25

## 2017-09-24 RX ORDER — DEXTROSE, SODIUM CHLORIDE, AND POTASSIUM CHLORIDE 5; .45; .15 G/100ML; G/100ML; G/100ML
150 INJECTION INTRAVENOUS CONTINUOUS PRN
Status: DISCONTINUED | OUTPATIENT
Start: 2017-09-24 | End: 2017-09-25

## 2017-09-24 RX ORDER — SODIUM CHLORIDE AND POTASSIUM CHLORIDE 150; 450 MG/100ML; MG/100ML
250 INJECTION, SOLUTION INTRAVENOUS CONTINUOUS PRN
Status: DISCONTINUED | OUTPATIENT
Start: 2017-09-24 | End: 2017-09-25

## 2017-09-24 RX ORDER — POTASSIUM CHLORIDE 1.5 G/1.77G
10 POWDER, FOR SOLUTION ORAL AS NEEDED
Status: DISCONTINUED | OUTPATIENT
Start: 2017-09-24 | End: 2017-09-25

## 2017-09-24 RX ORDER — SODIUM CHLORIDE 0.9 % (FLUSH) 0.9 %
10 SYRINGE (ML) INJECTION AS NEEDED
Status: DISCONTINUED | OUTPATIENT
Start: 2017-09-24 | End: 2017-09-25

## 2017-09-24 RX ORDER — POTASSIUM CHLORIDE 750 MG/1
10 CAPSULE, EXTENDED RELEASE ORAL AS NEEDED
Status: DISCONTINUED | OUTPATIENT
Start: 2017-09-24 | End: 2017-09-25

## 2017-09-24 RX ORDER — SODIUM CHLORIDE 450 MG/100ML
250 INJECTION, SOLUTION INTRAVENOUS CONTINUOUS
Status: DISCONTINUED | OUTPATIENT
Start: 2017-09-25 | End: 2017-09-25

## 2017-09-24 RX ORDER — DEXTROSE MONOHYDRATE 25 G/50ML
12.5 INJECTION, SOLUTION INTRAVENOUS
Status: DISCONTINUED | OUTPATIENT
Start: 2017-09-24 | End: 2017-09-27 | Stop reason: HOSPADM

## 2017-09-24 RX ORDER — POTASSIUM CHLORIDE 1.5 G/1.77G
20 POWDER, FOR SOLUTION ORAL AS NEEDED
Status: DISCONTINUED | OUTPATIENT
Start: 2017-09-24 | End: 2017-09-25

## 2017-09-24 RX ADMIN — HUMAN INSULIN 10 UNITS: 100 INJECTION, SOLUTION SUBCUTANEOUS at 22:52

## 2017-09-24 RX ADMIN — SODIUM CHLORIDE 1632 ML: 9 INJECTION, SOLUTION INTRAVENOUS at 21:21

## 2017-09-24 RX ADMIN — ONDANSETRON 4 MG: 2 INJECTION INTRAMUSCULAR; INTRAVENOUS at 21:32

## 2017-09-24 RX ADMIN — SODIUM CHLORIDE 1000 ML: 9 INJECTION, SOLUTION INTRAVENOUS at 22:21

## 2017-09-24 RX ADMIN — SODIUM CHLORIDE 0.1 UNITS/KG/HR: 9 INJECTION, SOLUTION INTRAVENOUS at 23:11

## 2017-09-24 RX ADMIN — SODIUM CHLORIDE 1000 ML: 9 INJECTION, SOLUTION INTRAVENOUS at 22:53

## 2017-09-25 PROBLEM — Z86.59 HISTORY OF BIPOLAR DISORDER: Status: ACTIVE | Noted: 2017-09-25

## 2017-09-25 PROBLEM — Z86.59 HISTORY OF SCHIZOPHRENIA: Status: ACTIVE | Noted: 2017-09-25

## 2017-09-25 PROBLEM — E10.65 POORLY CONTROLLED TYPE 1 DIABETES MELLITUS (HCC): Status: ACTIVE | Noted: 2017-09-25

## 2017-09-25 PROBLEM — N17.9 ACUTE RENAL FAILURE (HCC): Status: ACTIVE | Noted: 2017-09-25

## 2017-09-25 LAB
ALBUMIN SERPL-MCNC: 4.7 G/DL (ref 3.5–5.2)
ALBUMIN/GLOB SERPL: 1.5 G/DL
ALP SERPL-CCNC: 123 U/L (ref 39–117)
ALT SERPL W P-5'-P-CCNC: 23 U/L (ref 1–41)
ANION GAP SERPL CALCULATED.3IONS-SCNC: 11.7 MMOL/L
ANION GAP SERPL CALCULATED.3IONS-SCNC: 15.4 MMOL/L
ANION GAP SERPL CALCULATED.3IONS-SCNC: 20.7 MMOL/L
ANION GAP SERPL CALCULATED.3IONS-SCNC: 32.5 MMOL/L
ANION GAP SERPL CALCULATED.3IONS-SCNC: 43.5 MMOL/L
AST SERPL-CCNC: 24 U/L (ref 1–40)
BILIRUB SERPL-MCNC: 0.4 MG/DL (ref 0.1–1.2)
BUN BLD-MCNC: 14 MG/DL (ref 6–20)
BUN BLD-MCNC: 16 MG/DL (ref 6–20)
BUN BLD-MCNC: 21 MG/DL (ref 6–20)
BUN BLD-MCNC: 30 MG/DL (ref 6–20)
BUN BLD-MCNC: 31 MG/DL (ref 6–20)
BUN/CREAT SERPL: 17.3 (ref 7–25)
BUN/CREAT SERPL: 18 (ref 7–25)
BUN/CREAT SERPL: 18.7 (ref 7–25)
BUN/CREAT SERPL: 20.2 (ref 7–25)
BUN/CREAT SERPL: 21.4 (ref 7–25)
CA-I BLD-MCNC: 4.7 MG/DL (ref 4.6–5.4)
CA-I BLD-MCNC: 4.8 MG/DL (ref 4.6–5.4)
CA-I BLD-MCNC: 5 MG/DL (ref 4.6–5.4)
CA-I BLD-MCNC: 5 MG/DL (ref 4.6–5.4)
CA-I SERPL ISE-MCNC: 1.18 MMOL/L (ref 1.1–1.35)
CA-I SERPL ISE-MCNC: 1.19 MMOL/L (ref 1.1–1.35)
CA-I SERPL ISE-MCNC: 1.24 MMOL/L (ref 1.1–1.35)
CA-I SERPL ISE-MCNC: 1.25 MMOL/L (ref 1.1–1.35)
CALCIUM SPEC-SCNC: 7.5 MG/DL (ref 8.6–10.5)
CALCIUM SPEC-SCNC: 7.6 MG/DL (ref 8.6–10.5)
CALCIUM SPEC-SCNC: 7.7 MG/DL (ref 8.6–10.5)
CALCIUM SPEC-SCNC: 7.8 MG/DL (ref 8.6–10.5)
CALCIUM SPEC-SCNC: 9.4 MG/DL (ref 8.6–10.5)
CHLORIDE SERPL-SCNC: 107 MMOL/L (ref 98–107)
CHLORIDE SERPL-SCNC: 107 MMOL/L (ref 98–107)
CHLORIDE SERPL-SCNC: 108 MMOL/L (ref 98–107)
CHLORIDE SERPL-SCNC: 108 MMOL/L (ref 98–107)
CHLORIDE SERPL-SCNC: 93 MMOL/L (ref 98–107)
CO2 SERPL-SCNC: 14.3 MMOL/L (ref 22–29)
CO2 SERPL-SCNC: 17.6 MMOL/L (ref 22–29)
CO2 SERPL-SCNC: 18.3 MMOL/L (ref 22–29)
CO2 SERPL-SCNC: 2.5 MMOL/L (ref 22–29)
CO2 SERPL-SCNC: 5.5 MMOL/L (ref 22–29)
CREAT BLD-MCNC: 0.81 MG/DL (ref 0.76–1.27)
CREAT BLD-MCNC: 0.89 MG/DL (ref 0.76–1.27)
CREAT BLD-MCNC: 1.04 MG/DL (ref 0.76–1.27)
CREAT BLD-MCNC: 1.4 MG/DL (ref 0.76–1.27)
CREAT BLD-MCNC: 1.66 MG/DL (ref 0.76–1.27)
D-LACTATE SERPL-SCNC: 1.7 MMOL/L (ref 0.5–2)
GFR SERPL CREATININE-BSD FRML MDRD: 108 ML/MIN/1.73
GFR SERPL CREATININE-BSD FRML MDRD: 47 ML/MIN/1.73
GFR SERPL CREATININE-BSD FRML MDRD: 58 ML/MIN/1.73
GFR SERPL CREATININE-BSD FRML MDRD: 81 ML/MIN/1.73
GFR SERPL CREATININE-BSD FRML MDRD: 97 ML/MIN/1.73
GLOBULIN UR ELPH-MCNC: 3.1 GM/DL
GLUCOSE BLD-MCNC: 155 MG/DL (ref 65–99)
GLUCOSE BLD-MCNC: 190 MG/DL (ref 65–99)
GLUCOSE BLD-MCNC: 191 MG/DL (ref 65–99)
GLUCOSE BLD-MCNC: 545 MG/DL (ref 65–99)
GLUCOSE BLD-MCNC: 833 MG/DL (ref 65–99)
GLUCOSE BLDC GLUCOMTR-MCNC: 149 MG/DL (ref 70–130)
GLUCOSE BLDC GLUCOMTR-MCNC: 149 MG/DL (ref 70–130)
GLUCOSE BLDC GLUCOMTR-MCNC: 157 MG/DL (ref 70–130)
GLUCOSE BLDC GLUCOMTR-MCNC: 159 MG/DL (ref 70–130)
GLUCOSE BLDC GLUCOMTR-MCNC: 162 MG/DL (ref 70–130)
GLUCOSE BLDC GLUCOMTR-MCNC: 162 MG/DL (ref 70–130)
GLUCOSE BLDC GLUCOMTR-MCNC: 165 MG/DL (ref 70–130)
GLUCOSE BLDC GLUCOMTR-MCNC: 170 MG/DL (ref 70–130)
GLUCOSE BLDC GLUCOMTR-MCNC: 172 MG/DL (ref 70–130)
GLUCOSE BLDC GLUCOMTR-MCNC: 174 MG/DL (ref 70–130)
GLUCOSE BLDC GLUCOMTR-MCNC: 181 MG/DL (ref 70–130)
GLUCOSE BLDC GLUCOMTR-MCNC: 184 MG/DL (ref 70–130)
GLUCOSE BLDC GLUCOMTR-MCNC: 197 MG/DL (ref 70–130)
GLUCOSE BLDC GLUCOMTR-MCNC: 202 MG/DL (ref 70–130)
GLUCOSE BLDC GLUCOMTR-MCNC: 255 MG/DL (ref 70–130)
GLUCOSE BLDC GLUCOMTR-MCNC: 308 MG/DL (ref 70–130)
GLUCOSE BLDC GLUCOMTR-MCNC: 554 MG/DL (ref 70–130)
HBA1C MFR BLD: 9.89 % (ref 4.8–5.6)
MAGNESIUM SERPL-MCNC: 1.9 MG/DL (ref 1.6–2.6)
MAGNESIUM SERPL-MCNC: 1.9 MG/DL (ref 1.6–2.6)
MAGNESIUM SERPL-MCNC: 2 MG/DL (ref 1.6–2.6)
MAGNESIUM SERPL-MCNC: 2.6 MG/DL (ref 1.6–2.6)
PHOSPHATE SERPL-MCNC: 2.2 MG/DL (ref 2.5–4.5)
PHOSPHATE SERPL-MCNC: 2.5 MG/DL (ref 2.5–4.5)
PHOSPHATE SERPL-MCNC: 2.6 MG/DL (ref 2.5–4.5)
PHOSPHATE SERPL-MCNC: 3.1 MG/DL (ref 2.5–4.5)
PHOSPHATE SERPL-MCNC: 5.4 MG/DL (ref 2.5–4.5)
POTASSIUM BLD-SCNC: 4.2 MMOL/L (ref 3.5–5.2)
POTASSIUM BLD-SCNC: 4.3 MMOL/L (ref 3.5–5.2)
POTASSIUM BLD-SCNC: 4.4 MMOL/L (ref 3.5–5.2)
POTASSIUM BLD-SCNC: 5 MMOL/L (ref 3.5–5.2)
POTASSIUM BLD-SCNC: 6.4 MMOL/L (ref 3.5–5.2)
PROT SERPL-MCNC: 7.8 G/DL (ref 6–8.5)
SODIUM BLD-SCNC: 138 MMOL/L (ref 136–145)
SODIUM BLD-SCNC: 139 MMOL/L (ref 136–145)
SODIUM BLD-SCNC: 140 MMOL/L (ref 136–145)
SODIUM BLD-SCNC: 142 MMOL/L (ref 136–145)
SODIUM BLD-SCNC: 146 MMOL/L (ref 136–145)

## 2017-09-25 PROCEDURE — 63710000001 INSULIN REGULAR HUMAN PER 5 UNITS: Performed by: NURSE PRACTITIONER

## 2017-09-25 PROCEDURE — 80048 BASIC METABOLIC PNL TOTAL CA: CPT | Performed by: EMERGENCY MEDICINE

## 2017-09-25 PROCEDURE — 63710000001 INSULIN ASPART PER 5 UNITS: Performed by: INTERNAL MEDICINE

## 2017-09-25 PROCEDURE — 84100 ASSAY OF PHOSPHORUS: CPT | Performed by: NURSE PRACTITIONER

## 2017-09-25 PROCEDURE — 82962 GLUCOSE BLOOD TEST: CPT

## 2017-09-25 PROCEDURE — 83605 ASSAY OF LACTIC ACID: CPT | Performed by: EMERGENCY MEDICINE

## 2017-09-25 PROCEDURE — 84100 ASSAY OF PHOSPHORUS: CPT | Performed by: EMERGENCY MEDICINE

## 2017-09-25 PROCEDURE — 80048 BASIC METABOLIC PNL TOTAL CA: CPT | Performed by: NURSE PRACTITIONER

## 2017-09-25 PROCEDURE — 82330 ASSAY OF CALCIUM: CPT | Performed by: EMERGENCY MEDICINE

## 2017-09-25 PROCEDURE — 99254 IP/OBS CNSLTJ NEW/EST MOD 60: CPT | Performed by: INTERNAL MEDICINE

## 2017-09-25 PROCEDURE — 63710000001 INSULIN DETEMER PER 5 UNITS: Performed by: INTERNAL MEDICINE

## 2017-09-25 PROCEDURE — 83735 ASSAY OF MAGNESIUM: CPT | Performed by: NURSE PRACTITIONER

## 2017-09-25 PROCEDURE — 82330 ASSAY OF CALCIUM: CPT | Performed by: NURSE PRACTITIONER

## 2017-09-25 PROCEDURE — 83036 HEMOGLOBIN GLYCOSYLATED A1C: CPT | Performed by: NURSE PRACTITIONER

## 2017-09-25 RX ORDER — PAROXETINE HYDROCHLORIDE 20 MG/1
40 TABLET, FILM COATED ORAL EVERY MORNING
COMMUNITY

## 2017-09-25 RX ORDER — POTASSIUM CHLORIDE 750 MG/1
10 CAPSULE, EXTENDED RELEASE ORAL AS NEEDED
Status: DISCONTINUED | OUTPATIENT
Start: 2017-09-25 | End: 2017-09-25 | Stop reason: SDUPTHER

## 2017-09-25 RX ORDER — POTASSIUM CHLORIDE 750 MG/1
40 CAPSULE, EXTENDED RELEASE ORAL AS NEEDED
Status: DISCONTINUED | OUTPATIENT
Start: 2017-09-25 | End: 2017-09-25 | Stop reason: SDUPTHER

## 2017-09-25 RX ORDER — DEXTROSE MONOHYDRATE 25 G/50ML
12.5 INJECTION, SOLUTION INTRAVENOUS
Status: DISCONTINUED | OUTPATIENT
Start: 2017-09-25 | End: 2017-09-25 | Stop reason: SDUPTHER

## 2017-09-25 RX ORDER — QUETIAPINE FUMARATE 100 MG/1
200 TABLET, FILM COATED ORAL 2 TIMES DAILY
COMMUNITY

## 2017-09-25 RX ORDER — QUETIAPINE FUMARATE 200 MG/1
200 TABLET, FILM COATED ORAL 2 TIMES DAILY
Status: DISCONTINUED | OUTPATIENT
Start: 2017-09-25 | End: 2017-09-27 | Stop reason: HOSPADM

## 2017-09-25 RX ORDER — PAROXETINE HYDROCHLORIDE 20 MG/1
20 TABLET, FILM COATED ORAL EVERY MORNING
Status: DISCONTINUED | OUTPATIENT
Start: 2017-09-25 | End: 2017-09-27 | Stop reason: HOSPADM

## 2017-09-25 RX ORDER — BUTALBITAL, ACETAMINOPHEN AND CAFFEINE 50; 325; 40 MG/1; MG/1; MG/1
1 TABLET ORAL EVERY 4 HOURS PRN
Status: DISCONTINUED | OUTPATIENT
Start: 2017-09-25 | End: 2017-09-27 | Stop reason: HOSPADM

## 2017-09-25 RX ORDER — ASPIRIN 81 MG/1
81 TABLET ORAL DAILY
Status: DISCONTINUED | OUTPATIENT
Start: 2017-09-25 | End: 2017-09-27 | Stop reason: HOSPADM

## 2017-09-25 RX ORDER — ONDANSETRON 2 MG/ML
4 INJECTION INTRAMUSCULAR; INTRAVENOUS EVERY 6 HOURS PRN
Status: DISCONTINUED | OUTPATIENT
Start: 2017-09-25 | End: 2017-09-27 | Stop reason: HOSPADM

## 2017-09-25 RX ORDER — POTASSIUM CHLORIDE 1.5 G/1.77G
10 POWDER, FOR SOLUTION ORAL AS NEEDED
Status: DISCONTINUED | OUTPATIENT
Start: 2017-09-25 | End: 2017-09-25 | Stop reason: SDUPTHER

## 2017-09-25 RX ORDER — POTASSIUM CHLORIDE 7.46 G/1000ML
10 INJECTION, SOLUTION INTRAVENOUS
Status: DISCONTINUED | OUTPATIENT
Start: 2017-09-25 | End: 2017-09-25 | Stop reason: SDUPTHER

## 2017-09-25 RX ORDER — AMITRIPTYLINE HYDROCHLORIDE 25 MG/1
25 TABLET, FILM COATED ORAL NIGHTLY
Status: DISCONTINUED | OUTPATIENT
Start: 2017-09-25 | End: 2017-09-27 | Stop reason: HOSPADM

## 2017-09-25 RX ORDER — NICOTINE POLACRILEX 4 MG
15 LOZENGE BUCCAL
Status: DISCONTINUED | OUTPATIENT
Start: 2017-09-25 | End: 2017-09-27 | Stop reason: HOSPADM

## 2017-09-25 RX ORDER — SODIUM CHLORIDE 0.9 % (FLUSH) 0.9 %
1-10 SYRINGE (ML) INJECTION AS NEEDED
Status: DISCONTINUED | OUTPATIENT
Start: 2017-09-25 | End: 2017-09-27 | Stop reason: HOSPADM

## 2017-09-25 RX ORDER — DIVALPROEX SODIUM 250 MG/1
250 TABLET, DELAYED RELEASE ORAL EVERY 12 HOURS SCHEDULED
Status: DISCONTINUED | OUTPATIENT
Start: 2017-09-25 | End: 2017-09-27 | Stop reason: HOSPADM

## 2017-09-25 RX ORDER — POTASSIUM CHLORIDE 1.5 G/1.77G
40 POWDER, FOR SOLUTION ORAL AS NEEDED
Status: DISCONTINUED | OUTPATIENT
Start: 2017-09-25 | End: 2017-09-25 | Stop reason: SDUPTHER

## 2017-09-25 RX ORDER — ACETAMINOPHEN 325 MG/1
650 TABLET ORAL EVERY 6 HOURS PRN
Status: DISCONTINUED | OUTPATIENT
Start: 2017-09-25 | End: 2017-09-27 | Stop reason: HOSPADM

## 2017-09-25 RX ORDER — DEXTROSE, SODIUM CHLORIDE, AND POTASSIUM CHLORIDE 5; .45; .15 G/100ML; G/100ML; G/100ML
150 INJECTION INTRAVENOUS CONTINUOUS PRN
Status: DISCONTINUED | OUTPATIENT
Start: 2017-09-25 | End: 2017-09-25 | Stop reason: SDUPTHER

## 2017-09-25 RX ORDER — SODIUM CHLORIDE 450 MG/100ML
250 INJECTION, SOLUTION INTRAVENOUS CONTINUOUS
Status: DISCONTINUED | OUTPATIENT
Start: 2017-09-25 | End: 2017-09-25 | Stop reason: SDUPTHER

## 2017-09-25 RX ORDER — DEXTROSE AND SODIUM CHLORIDE 5; .45 G/100ML; G/100ML
150 INJECTION, SOLUTION INTRAVENOUS CONTINUOUS PRN
Status: DISCONTINUED | OUTPATIENT
Start: 2017-09-25 | End: 2017-09-25 | Stop reason: SDUPTHER

## 2017-09-25 RX ORDER — POTASSIUM CHLORIDE 750 MG/1
20 CAPSULE, EXTENDED RELEASE ORAL AS NEEDED
Status: DISCONTINUED | OUTPATIENT
Start: 2017-09-25 | End: 2017-09-25 | Stop reason: SDUPTHER

## 2017-09-25 RX ORDER — SODIUM CHLORIDE AND POTASSIUM CHLORIDE 150; 450 MG/100ML; MG/100ML
250 INJECTION, SOLUTION INTRAVENOUS CONTINUOUS PRN
Status: DISCONTINUED | OUTPATIENT
Start: 2017-09-25 | End: 2017-09-25 | Stop reason: SDUPTHER

## 2017-09-25 RX ORDER — ONDANSETRON 4 MG/1
4 TABLET, FILM COATED ORAL EVERY 6 HOURS PRN
Status: DISCONTINUED | OUTPATIENT
Start: 2017-09-25 | End: 2017-09-27 | Stop reason: HOSPADM

## 2017-09-25 RX ORDER — DEXTROSE MONOHYDRATE 25 G/50ML
25 INJECTION, SOLUTION INTRAVENOUS
Status: DISCONTINUED | OUTPATIENT
Start: 2017-09-25 | End: 2017-09-27 | Stop reason: HOSPADM

## 2017-09-25 RX ORDER — POTASSIUM CHLORIDE 1.5 G/1.77G
20 POWDER, FOR SOLUTION ORAL AS NEEDED
Status: DISCONTINUED | OUTPATIENT
Start: 2017-09-25 | End: 2017-09-25 | Stop reason: SDUPTHER

## 2017-09-25 RX ORDER — ONDANSETRON 4 MG/1
4 TABLET, ORALLY DISINTEGRATING ORAL EVERY 6 HOURS PRN
Status: DISCONTINUED | OUTPATIENT
Start: 2017-09-25 | End: 2017-09-27 | Stop reason: HOSPADM

## 2017-09-25 RX ADMIN — SODIUM CHLORIDE 1.9 UNITS/HR: 9 INJECTION, SOLUTION INTRAVENOUS at 10:15

## 2017-09-25 RX ADMIN — DIVALPROEX SODIUM 250 MG: 250 TABLET, DELAYED RELEASE ORAL at 09:22

## 2017-09-25 RX ADMIN — POTASSIUM CHLORIDE 10 MEQ: 750 CAPSULE, EXTENDED RELEASE ORAL at 10:34

## 2017-09-25 RX ADMIN — POTASSIUM CHLORIDE, DEXTROSE MONOHYDRATE AND SODIUM CHLORIDE 150 ML/HR: 150; 5; 450 INJECTION, SOLUTION INTRAVENOUS at 04:19

## 2017-09-25 RX ADMIN — INSULIN DETEMIR 15 UNITS: 100 INJECTION, SOLUTION SUBCUTANEOUS at 15:51

## 2017-09-25 RX ADMIN — INSULIN ASPART 2 UNITS: 100 INJECTION, SOLUTION INTRAVENOUS; SUBCUTANEOUS at 17:51

## 2017-09-25 RX ADMIN — DIVALPROEX SODIUM 250 MG: 250 TABLET, DELAYED RELEASE ORAL at 20:20

## 2017-09-25 RX ADMIN — INSULIN ASPART 2 UNITS: 100 INJECTION, SOLUTION INTRAVENOUS; SUBCUTANEOUS at 20:20

## 2017-09-25 RX ADMIN — POTASSIUM CHLORIDE 10 MEQ: 750 CAPSULE, EXTENDED RELEASE ORAL at 06:04

## 2017-09-25 RX ADMIN — POTASSIUM CHLORIDE, DEXTROSE MONOHYDRATE AND SODIUM CHLORIDE 150 ML/HR: 150; 5; 450 INJECTION, SOLUTION INTRAVENOUS at 10:35

## 2017-09-25 RX ADMIN — PAROXETINE HYDROCHLORIDE 20 MG: 20 TABLET, FILM COATED ORAL at 15:51

## 2017-09-25 RX ADMIN — INSULIN ASPART 4 UNITS: 100 INJECTION, SOLUTION INTRAVENOUS; SUBCUTANEOUS at 17:50

## 2017-09-25 RX ADMIN — SODIUM CHLORIDE 250 ML/HR: 4.5 INJECTION, SOLUTION INTRAVENOUS at 00:08

## 2017-09-25 RX ADMIN — QUETIAPINE FUMARATE 200 MG: 200 TABLET, FILM COATED ORAL at 20:20

## 2017-09-25 RX ADMIN — AMITRIPTYLINE HYDROCHLORIDE 25 MG: 25 TABLET, FILM COATED ORAL at 20:20

## 2017-09-25 RX ADMIN — ASPIRIN 81 MG: 81 TABLET ORAL at 09:22

## 2017-09-26 PROBLEM — E78.5 HYPERLIPIDEMIA: Status: ACTIVE | Noted: 2017-09-26

## 2017-09-26 LAB
ALBUMIN UR-MCNC: <1.2 MG/L
ANION GAP SERPL CALCULATED.3IONS-SCNC: 13.6 MMOL/L
BUN BLD-MCNC: 6 MG/DL (ref 6–20)
BUN/CREAT SERPL: 10.7 (ref 7–25)
CALCIUM SPEC-SCNC: 7.5 MG/DL (ref 8.6–10.5)
CHLORIDE SERPL-SCNC: 110 MMOL/L (ref 98–107)
CHOLEST SERPL-MCNC: 160 MG/DL (ref 0–200)
CO2 SERPL-SCNC: 20.4 MMOL/L (ref 22–29)
CREAT BLD-MCNC: 0.56 MG/DL (ref 0.76–1.27)
CREAT UR-MCNC: 63.4 MG/DL
GFR SERPL CREATININE-BSD FRML MDRD: >150 ML/MIN/1.73
GLUCOSE BLD-MCNC: 56 MG/DL (ref 65–99)
GLUCOSE BLDC GLUCOMTR-MCNC: 197 MG/DL (ref 70–130)
GLUCOSE BLDC GLUCOMTR-MCNC: 244 MG/DL (ref 70–130)
GLUCOSE BLDC GLUCOMTR-MCNC: 269 MG/DL (ref 70–130)
GLUCOSE BLDC GLUCOMTR-MCNC: 84 MG/DL (ref 70–130)
HDLC SERPL-MCNC: 37 MG/DL (ref 40–60)
LDLC SERPL CALC-MCNC: 96 MG/DL (ref 0–100)
LDLC/HDLC SERPL: 2.61 {RATIO}
MICROALBUMIN/CREAT UR: NORMAL MG/G
POTASSIUM BLD-SCNC: 3.2 MMOL/L (ref 3.5–5.2)
SODIUM BLD-SCNC: 144 MMOL/L (ref 136–145)
TRIGL SERPL-MCNC: 133 MG/DL (ref 0–150)
VLDLC SERPL-MCNC: 26.6 MG/DL (ref 5–40)

## 2017-09-26 PROCEDURE — 82043 UR ALBUMIN QUANTITATIVE: CPT | Performed by: INTERNAL MEDICINE

## 2017-09-26 PROCEDURE — 80061 LIPID PANEL: CPT | Performed by: INTERNAL MEDICINE

## 2017-09-26 PROCEDURE — 82962 GLUCOSE BLOOD TEST: CPT

## 2017-09-26 PROCEDURE — 99232 SBSQ HOSP IP/OBS MODERATE 35: CPT | Performed by: INTERNAL MEDICINE

## 2017-09-26 PROCEDURE — 82570 ASSAY OF URINE CREATININE: CPT | Performed by: INTERNAL MEDICINE

## 2017-09-26 PROCEDURE — 63710000001 INSULIN ASPART PER 5 UNITS: Performed by: INTERNAL MEDICINE

## 2017-09-26 PROCEDURE — 80048 BASIC METABOLIC PNL TOTAL CA: CPT | Performed by: INTERNAL MEDICINE

## 2017-09-26 RX ORDER — POTASSIUM CHLORIDE 750 MG/1
20 CAPSULE, EXTENDED RELEASE ORAL ONCE
Status: COMPLETED | OUTPATIENT
Start: 2017-09-26 | End: 2017-09-26

## 2017-09-26 RX ADMIN — INSULIN ASPART 4 UNITS: 100 INJECTION, SOLUTION INTRAVENOUS; SUBCUTANEOUS at 17:48

## 2017-09-26 RX ADMIN — QUETIAPINE FUMARATE 200 MG: 200 TABLET, FILM COATED ORAL at 20:13

## 2017-09-26 RX ADMIN — POTASSIUM CHLORIDE 20 MEQ: 750 CAPSULE, EXTENDED RELEASE ORAL at 09:51

## 2017-09-26 RX ADMIN — DIVALPROEX SODIUM 250 MG: 250 TABLET, DELAYED RELEASE ORAL at 08:22

## 2017-09-26 RX ADMIN — PAROXETINE HYDROCHLORIDE 20 MG: 20 TABLET, FILM COATED ORAL at 06:37

## 2017-09-26 RX ADMIN — ASPIRIN 81 MG: 81 TABLET ORAL at 08:22

## 2017-09-26 RX ADMIN — INSULIN ASPART 4 UNITS: 100 INJECTION, SOLUTION INTRAVENOUS; SUBCUTANEOUS at 09:51

## 2017-09-26 RX ADMIN — AMITRIPTYLINE HYDROCHLORIDE 25 MG: 25 TABLET, FILM COATED ORAL at 20:13

## 2017-09-26 RX ADMIN — INSULIN ASPART 4 UNITS: 100 INJECTION, SOLUTION INTRAVENOUS; SUBCUTANEOUS at 12:30

## 2017-09-26 RX ADMIN — INSULIN ASPART 2 UNITS: 100 INJECTION, SOLUTION INTRAVENOUS; SUBCUTANEOUS at 12:30

## 2017-09-26 RX ADMIN — QUETIAPINE FUMARATE 200 MG: 200 TABLET, FILM COATED ORAL at 08:23

## 2017-09-26 RX ADMIN — INSULIN ASPART 6 UNITS: 100 INJECTION, SOLUTION INTRAVENOUS; SUBCUTANEOUS at 20:14

## 2017-09-26 RX ADMIN — DIVALPROEX SODIUM 250 MG: 250 TABLET, DELAYED RELEASE ORAL at 20:13

## 2017-09-27 VITALS
HEIGHT: 63 IN | WEIGHT: 111.55 LBS | RESPIRATION RATE: 18 BRPM | TEMPERATURE: 98.7 F | OXYGEN SATURATION: 97 % | BODY MASS INDEX: 19.77 KG/M2 | DIASTOLIC BLOOD PRESSURE: 78 MMHG | HEART RATE: 93 BPM | SYSTOLIC BLOOD PRESSURE: 118 MMHG

## 2017-09-27 PROBLEM — E55.9 VITAMIN D DEFICIENCY: Status: ACTIVE | Noted: 2017-09-27

## 2017-09-27 PROBLEM — Z59.00 HOMELESS: Status: ACTIVE | Noted: 2017-09-27

## 2017-09-27 LAB
25(OH)D3 SERPL-MCNC: 17.7 NG/ML (ref 30–100)
ANION GAP SERPL CALCULATED.3IONS-SCNC: 12 MMOL/L
BUN BLD-MCNC: 7 MG/DL (ref 6–20)
BUN/CREAT SERPL: 12.7 (ref 7–25)
CALCIUM SPEC-SCNC: 8.6 MG/DL (ref 8.6–10.5)
CHLORIDE SERPL-SCNC: 102 MMOL/L (ref 98–107)
CO2 SERPL-SCNC: 28 MMOL/L (ref 22–29)
CREAT BLD-MCNC: 0.55 MG/DL (ref 0.76–1.27)
GFR SERPL CREATININE-BSD FRML MDRD: >150 ML/MIN/1.73
GLUCOSE BLD-MCNC: 42 MG/DL (ref 65–99)
GLUCOSE BLDC GLUCOMTR-MCNC: 124 MG/DL (ref 70–130)
GLUCOSE BLDC GLUCOMTR-MCNC: 215 MG/DL (ref 70–130)
GLUCOSE BLDC GLUCOMTR-MCNC: 329 MG/DL (ref 70–130)
POTASSIUM BLD-SCNC: 3.6 MMOL/L (ref 3.5–5.2)
SODIUM BLD-SCNC: 142 MMOL/L (ref 136–145)

## 2017-09-27 PROCEDURE — 82962 GLUCOSE BLOOD TEST: CPT

## 2017-09-27 PROCEDURE — 99232 SBSQ HOSP IP/OBS MODERATE 35: CPT | Performed by: INTERNAL MEDICINE

## 2017-09-27 PROCEDURE — 63710000001 INSULIN ASPART PER 5 UNITS: Performed by: INTERNAL MEDICINE

## 2017-09-27 PROCEDURE — 83516 IMMUNOASSAY NONANTIBODY: CPT | Performed by: INTERNAL MEDICINE

## 2017-09-27 PROCEDURE — 82306 VITAMIN D 25 HYDROXY: CPT | Performed by: INTERNAL MEDICINE

## 2017-09-27 PROCEDURE — 80048 BASIC METABOLIC PNL TOTAL CA: CPT | Performed by: INTERNAL MEDICINE

## 2017-09-27 RX ORDER — ATORVASTATIN CALCIUM 20 MG/1
20 TABLET, FILM COATED ORAL NIGHTLY
Status: DISCONTINUED | OUTPATIENT
Start: 2017-09-27 | End: 2017-09-27 | Stop reason: HOSPADM

## 2017-09-27 RX ORDER — MELATONIN
2000 DAILY
Status: DISCONTINUED | OUTPATIENT
Start: 2017-09-27 | End: 2017-09-27 | Stop reason: HOSPADM

## 2017-09-27 RX ORDER — DIVALPROEX SODIUM 125 MG/1
125 TABLET, DELAYED RELEASE ORAL 2 TIMES DAILY
COMMUNITY

## 2017-09-27 RX ADMIN — INSULIN ASPART 4 UNITS: 100 INJECTION, SOLUTION INTRAVENOUS; SUBCUTANEOUS at 12:26

## 2017-09-27 RX ADMIN — INSULIN ASPART 6 UNITS: 100 INJECTION, SOLUTION INTRAVENOUS; SUBCUTANEOUS at 18:04

## 2017-09-27 RX ADMIN — DIVALPROEX SODIUM 250 MG: 250 TABLET, DELAYED RELEASE ORAL at 08:08

## 2017-09-27 RX ADMIN — PAROXETINE HYDROCHLORIDE 20 MG: 20 TABLET, FILM COATED ORAL at 06:16

## 2017-09-27 RX ADMIN — VITAMIN D, TAB 1000IU (100/BT) 2000 UNITS: 25 TAB at 12:26

## 2017-09-27 RX ADMIN — ASPIRIN 81 MG: 81 TABLET ORAL at 08:08

## 2017-09-27 RX ADMIN — INSULIN ASPART 4 UNITS: 100 INJECTION, SOLUTION INTRAVENOUS; SUBCUTANEOUS at 08:07

## 2017-09-27 RX ADMIN — INSULIN ASPART 7 UNITS: 100 INJECTION, SOLUTION INTRAVENOUS; SUBCUTANEOUS at 18:04

## 2017-09-27 RX ADMIN — QUETIAPINE FUMARATE 200 MG: 200 TABLET, FILM COATED ORAL at 08:08

## 2017-09-27 RX ADMIN — INSULIN ASPART 6 UNITS: 100 INJECTION, SOLUTION INTRAVENOUS; SUBCUTANEOUS at 12:26

## 2017-09-28 LAB
GLIADIN PEPTIDE IGA SER-ACNC: 5 UNITS (ref 0–19)
IGA SERPL-MCNC: 122 MG/DL (ref 90–386)
TTG IGA SER-ACNC: <2 U/ML (ref 0–3)

## 2017-09-29 LAB
BACTERIA SPEC AEROBE CULT: NORMAL
BACTERIA SPEC AEROBE CULT: NORMAL
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
## 2022-08-11 NOTE — CONSULTS
Patient Identification:  NAME:  Octavio Pham  Age:  37 y.o.   Sex:  male   :  1980   MRN:  [id number]       Chief complaint: He doesn't have one reason for consult confusion and headaches    History of present illness:  This patient is 37-year-old right-handed white female with a history of paranoid schizophrenia migraine diabetes depression who comes to the hospital now and is had some increasing agitation since he's been here he is been angry yelling at the nurses etc. and finally has been calm down he is complained of some headache at some point but when I interview him today he doesn't even mentioned a headache at all.  Location is not pertinent duration not known quality with some type of headache and the context the patient has a history of migraine.      Past medical history:  Past Medical History:   Diagnosis Date   • Bipolar 1 disorder    • Depression    • Diabetes mellitus    • Kyphosis    • Migraine    • PTSD (post-traumatic stress disorder)    • Schizophrenia        Past surgical history:  Past Surgical History:   Procedure Laterality Date   • MOUTH SURGERY         Allergies:  Codeine; Gabapentin; Geodon [ziprasidone hcl]; Haldol [haloperidol lactate]; Lyrica [pregabalin]; Risperidone and related; Thorazine [chlorpromazine]; Tramadol; and Trazodone and nefazodone    Home medications:  Prescriptions Prior to Admission   Medication Sig Dispense Refill Last Dose   • aspirin 81 MG EC tablet Take 81 mg by mouth Daily.   8/15/2017 at Unknown time   • Atorvastatin Calcium (LIPITOR PO) Take  by mouth.   8/15/2017 at Unknown time   • FLUoxetine (PROZAC) 10 MG capsule Take 80 mg by mouth Daily.   8/15/2017 at Unknown time   • insulin detemir (LEVEMIR) 100 UNIT/ML injection Inject 40 Units under the skin Daily.   8/15/2017 at Unknown time   • insulin lispro (humaLOG) 100 UNIT/ML injection Inject  under the skin 3 (Three) Times a Day Before Meals.   8/15/2017 at Unknown time   • lurasidone (LATUDA) 40 MG  Copd education done tablet tablet Take 80 mg by mouth 2 (Two) Times a Day.   8/15/2017 at Unknown time        Hospital medications:    dextrose 31 g Oral Once   enoxaparin 40 mg Subcutaneous Daily   insulin aspart 2-5 Units Subcutaneous 4x Daily AC & at Bedtime   insulin aspart 4 Units Subcutaneous TID With Meals   insulin detemir 20 Units Subcutaneous Daily   melatonin 5 mg Oral Nightly   QUEtiapine 100 mg Oral Q12H   silver sulfadiazine  Topical Q12H       sodium chloride 0.45 % with KCl 20 mEq 250 mL/hr Last Rate: 250 mL/hr (08/16/17 0159)     •  butalbital-acetaminophen-caffeine  •  dextrose  •  HYDROcodone-acetaminophen  •  potassium chloride **OR** potassium chloride **OR** potassium chloride  •  potassium chloride **OR** potassium chloride **OR** potassium chloride  •  potassium chloride **OR** potassium chloride **OR** potassium chloride  •  sodium chloride 0.45 % with KCl 20 mEq  •  sodium chloride  •  sodium chloride    Family history:  History reviewed. No pertinent family history.    Social history:  Social History   Substance Use Topics   • Smoking status: Current Every Day Smoker   • Smokeless tobacco: None   • Alcohol use No       Review of systems:    He couldn't really answer any of this.  Note no family is present and I have reviewed the chart fully    Objective:  Vitals Ranges:   Temp:  [97.9 °F (36.6 °C)-98.8 °F (37.1 °C)] 98 °F (36.7 °C)  Heart Rate:  [69-84] 80  Resp:  [18] 18  BP: (110-129)/(72-86) 110/78      Physical Exam:  He's just been angry yelling at the nurses and all of that but now columns down is curled up in a ball and states that these hearing voices his fund of knowledge is poor attention span and concentration good recent and remote memory fair language function normal well-developed well-nourished does not appear to be in pain visual acuity normal at 3 feet pupils to constricting to 1 eyes are conjugate face is symmetrical he wouldn't follow any other testing he has bradykinesia and minimal  rigidity no resting tremor overall strength probably 5 out of 5 but very very poor effort reflexes absent throughout toes downgoing bilaterally sensation station coordination gait all of this was impossible for him to follow as he just wouldn't cooperate heart regular without murmur neck supple without bruits extremities no clubbing cyanosis or edema he is awake alert and oriented ×3 but actively having auditory hallucinations according to what he tells me    Results review:   I reviewed the patient's new clinical results.    Data review:  Lab Results (last 24 hours)     Procedure Component Value Units Date/Time    POC Glucose Fingerstick [[id number]]  (Abnormal) Collected:  08/18/17 1410    Specimen:  Blood Updated:  08/18/17 1412     Glucose 42 (C) mg/dL     Narrative:       RN Notified R and V Meter: [id number] : [id number] Crystal Gonzales    POC Glucose Fingerstick [[id number]]  (Abnormal) Collected:  08/18/17 1433    Specimen:  Blood Updated:  08/18/17 1434     Glucose 68 (L) mg/dL     Narrative:       Meter: [id number] : [id number] Crystal Gonzales    POC Glucose Fingerstick [[id number]]  (Normal) Collected:  08/18/17 1458    Specimen:  Blood Updated:  08/18/17 1500     Glucose 95 mg/dL     Narrative:       Meter: [id number] : [id number] Crystal Gonzales    POC Glucose Fingerstick [[id number]]  (Normal) Collected:  08/18/17 1650    Specimen:  Blood Updated:  08/18/17 1653     Glucose 96 mg/dL     Narrative:       Meter: [id number] : [id number] Pedro Pablo Mohan    POC Glucose Fingerstick [[id number]]  (Abnormal) Collected:  08/18/17 1953    Specimen:  Blood Updated:  08/18/17 1954     Glucose 144 (H) mg/dL     Narrative:       Meter: [id number] : [id number] Joya CLEMENTE    POC Glucose Fingerstick [[id number]]  (Abnormal) Collected:  08/17/17 0835    Specimen:  Blood Updated:  08/19/17 0508     Glucose 191 (H) mg/dL     Narrative:       Meter: [id number] : [id number] Liliana NICOLE    POC Glucose  Fingerstick [[id number]]  (Normal) Collected:  08/19/17 0726    Specimen:  Blood Updated:  08/19/17 0727     Glucose 127 mg/dL     Narrative:       Meter: [id number] : [id number] Mikaelsabinahelio Chasendangie NA    Magnesium [[id number]]  (Normal) Collected:  08/19/17 0644    Specimen:  Blood Updated:  08/19/17 0801     Magnesium 2.0 mg/dL     Basic Metabolic Panel [[id number]]  (Abnormal) Collected:  08/19/17 0644    Specimen:  Blood Updated:  08/19/17 0810     Glucose 100 (H) mg/dL      BUN 11 mg/dL      Creatinine 0.53 (L) mg/dL      Sodium 140 mmol/L      Potassium 4.0 mmol/L      Chloride 102 mmol/L      CO2 24.5 mmol/L      Calcium 8.9 mg/dL      eGFR Non African Amer >150 mL/min/1.73      BUN/Creatinine Ratio 20.8     Anion Gap 13.5 mmol/L     Narrative:       GFR Normal >60  Chronic Kidney Disease <60  Kidney Failure <15    POC Glucose Fingerstick [[id number]]  (Abnormal) Collected:  08/19/17 1101    Specimen:  Blood Updated:  08/19/17 1103     Glucose 319 (H) mg/dL     Narrative:       Meter: [id number] : [id number] Wonnomiladan Salvatoremayur NA           Imaging:  Imaging Results (last 24 hours)     ** No results found for the last 24 hours. **             Assessment and Plan:     Active Problems:    DKA (diabetic ketoacidoses)    This patient is psychotic and has history of paranoid schizophrenia per his history.  He is in some pain at this time by his complaints but does not appear to have significant pain I will not order additional diagnostic testing yet and I would agree with continuing the circumflex well.  He states that he is hearing voices actively at this moment.  I do not see evidence of an acute stroke TIA or primary seizure disorder area I will check additional diagnostic testing in the form of a plain CAT scan of the brain to make sure he does not have a subdural hematoma.  Tyrell Cannon MD  08/19/17  1:18 PM